# Patient Record
Sex: MALE | Race: WHITE
[De-identification: names, ages, dates, MRNs, and addresses within clinical notes are randomized per-mention and may not be internally consistent; named-entity substitution may affect disease eponyms.]

---
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
## 2020-08-11 ENCOUNTER — OFFICE VISIT (OUTPATIENT)
Dept: BEHAVIORAL HEALTH | Facility: CLINIC | Age: 20
End: 2020-08-11
Payer: COMMERCIAL

## 2020-08-11 VITALS — WEIGHT: 163 LBS

## 2020-08-11 DIAGNOSIS — F20.0 PARANOID SCHIZOPHRENIA (HCC): ICD-10-CM

## 2020-08-11 DIAGNOSIS — F32.1 CURRENT MODERATE EPISODE OF MAJOR DEPRESSIVE DISORDER WITHOUT PRIOR EPISODE (HCC): ICD-10-CM

## 2020-08-11 DIAGNOSIS — F41.1 GENERALIZED ANXIETY DISORDER: ICD-10-CM

## 2020-08-11 PROCEDURE — 99205 OFFICE O/P NEW HI 60 MIN: CPT | Performed by: PSYCHIATRY & NEUROLOGY

## 2020-08-11 PROCEDURE — 96127 BRIEF EMOTIONAL/BEHAV ASSMT: CPT | Performed by: PSYCHIATRY & NEUROLOGY

## 2020-08-11 RX ORDER — ARIPIPRAZOLE 5 MG/1
5 TABLET ORAL DAILY
Qty: 30 TAB | Refills: 0 | Status: SHIPPED | OUTPATIENT
Start: 2020-08-11 | End: 2020-08-20 | Stop reason: SDUPTHER

## 2020-08-11 ASSESSMENT — ANXIETY QUESTIONNAIRES
1. FEELING NERVOUS, ANXIOUS, OR ON EDGE: NEARLY EVERY DAY
2. NOT BEING ABLE TO STOP OR CONTROL WORRYING: NEARLY EVERY DAY
GAD7 TOTAL SCORE: 16
3. WORRYING TOO MUCH ABOUT DIFFERENT THINGS: MORE THAN HALF THE DAYS
7. FEELING AFRAID AS IF SOMETHING AWFUL MIGHT HAPPEN: NEARLY EVERY DAY
4. TROUBLE RELAXING: MORE THAN HALF THE DAYS
5. BEING SO RESTLESS THAT IT IS HARD TO SIT STILL: NOT AT ALL
6. BECOMING EASILY ANNOYED OR IRRITABLE: NEARLY EVERY DAY

## 2020-08-11 ASSESSMENT — PATIENT HEALTH QUESTIONNAIRE - PHQ9
CLINICAL INTERPRETATION OF PHQ2 SCORE: 5
SUM OF ALL RESPONSES TO PHQ QUESTIONS 1-9: 10
5. POOR APPETITE OR OVEREATING: 1 - SEVERAL DAYS

## 2020-08-11 NOTE — PROGRESS NOTES
"INITIAL PSYCHIATRIC EVALUATION      This provider informed the patient their medical records are totally confidential except for the use by other providers involved in their care, or if the patient signs a release, or to report instances of child or elder abuse, or if it is determined they are an immediate risk to harm themselves or others.      CHIEF COMPLAINT  \"I feel some people are against me\"    HISTORY OF PRESENT ILLNESS  Mckay Del Rosario is a 20 y.o. old male comes in today to establish care and for evaluation of anxiety and paranoia.  I did reviewed all outpatient psychiatry follow up notes over last 3 years. Patient is new to this clinic and presented with his mother and family member for recent worsening of paranoid ideations.  Patient believes that everyone is out to get him.  Patient also believed that some people are hacking his phone and PlayStation.  He frequently believes that most people hates him.  According to patient and family that this began last year in October but it was mild but it has increased very quickly over last few months with patient not able to perform well in his job as he believes one specific person believes him and target symptoms specifically.  Patient denies auditory or visual hallucination but family gives many examples consistent with new onset of paranoid delusions.  Family is concerned that he will get angry and may believe family is against him as last Friday he was questioning his family members questions for him.  Patient also report increased anxiety due to this belief and reports worrying about many things and have difficulty controlling his worries with associated muscle tension and him feeling easily irritated or frustrated.  Patient reports feeling depressed for these believes and reports feeling down with decline in interest with feelings of guilt and poor concentration but denies endorsing suicidal or homicidal ideations.  Patient denies meeting criteria " for current or past history of hypomania or fadumo.    I spent time alone with patient and later had session with patient and family together.  We agreed with diagnosis of schizophrenia and comorbid depression and anxiety and agreed with plan of initiating Zoloft and Abilify together.      PSYCHIATRIC REVIEW OF SYSTEMS: denies manic symptoms, denies OCD symptoms, denies restrictive eating or purging, denies trauma related symptoms, see HPI for depressive symptoms, see HPI for anxeity symptoms and see HPI for psychotic symptoms      MEDICAL REVIEW OF SYSTEMS:   Constitutional negative   Eyes negative   Ears/Nose/Mouth/Throat negative   Cardiovascular negative   Respiratory negative   Gastrointestinal negative   Genitourinary negative   Muscular negative   Integumentary negative   Neurological negative   Endocrine negative   Hematologic/Lymphatic negative     CURRENT MEDICATIONS:  Current Outpatient Medications   Medication Sig Dispense Refill   • albuterol (PROVENTIL) 2.5mg/3ml NEBU solution for nebulization 3 mL by Nebulization route every four hours as needed for Shortness of Breath. 75 mL 0   • ADVAIR DISKUS INH Inhale  by mouth as needed.     • ALBUTEROL 90 MCG/ACT AERS Inhale 2 Puffs by mouth every 6 hours as needed for Shortness of Breath. 1 Inhaler 3     No current facility-administered medications for this visit.        ALLERGIES:  Patient has no known allergies.    PAST PSYCHIATRIC HISTORY  Prior psychiatric hospitalization: none  Prior Self harm/suicide attempt: no  Prior Diagnosis: none    PAST PSYCHIATRIC MEDICATIONS  none     FAMILY HISTORY  Psychiatric diagnosis: Maternal uncle with schizophrenia; mother with anxiety; maternal grandmother with depression  History of suicide attempts:  no  Substance abuse history:  no    SUBSTANCE USE HISTORY:  ALCOHOL: no  TOBACCO: no  CANNABIS: occasional: last use 2 months ago; make anxiety worse  OPIOIDS: no  PRESCRIPTION MEDICATIONS: no  OTHERS: no  History of  inpatient/outpatient rehab treatment: no    SOCIAL HISTORY:  Education: Special education studies; IEP; received adjusted diploma in 2018  Employment: employed  Relationship: single  Kids: no  Current living situation: lives with family  Current/past legal issues: no  History of emotional/physical/sexual abuse - no    MEDICAL HISTORY  Past Medical History:   Diagnosis Date   • Asthma      Past Surgical History:   Procedure Laterality Date   • ADENOIDECTOMY     • IN CREATE EARDRUM OPENING,GEN ANESTH      Ear tubes         PHYSICAL EXAMINAION:  Vital signs: Wt 73.9 kg (163 lb)   Musculoskeletal: Normal gait.   Abnormal movements: none    MENTAL STATUS EXAMINATION      General:   - Grooming and hygiene: Casual,   - Apparent distress: none,   - Behavior: Tense  - Eye Contact:  Good,   - no psychomotor agitation or retardation    - Participation: Active verbal participation  Orientation: Alert and Fully Oriented to person, place and time  Mood: Depressed and Anxious  Affect: Constricted,  Thought Process: Goal-directed  Thought Content: Denies suicidal or homicidal ideations, intent or plan Within normal limits  Perception: Denies auditory or visual hallucinations. paraonoid delusions noted  Attention span and concentration: Intact   Speech:Rate within normal limits and Volume within normal limits  Language: Appropriate   Insight: Adequate  Judgment: Adequate  Recent and remote memory: No gross evidence of memory deficits      DEPRESSION SCREENING:  Depression Screen (PHQ-2/PHQ-9) 8/11/2020   PHQ-2 Total Score 5   PHQ-9 Total Score 10       Interpretation of PHQ-9 Total Score   Score Severity   1-4 No Depression   5-9 Mild Depression   10-14 Moderate Depression   15-19 Moderately Severe Depression   20-27 Severe Depression      SAFETY ASSESSMENT - SELF:    Does patient acknowledge current or past symptoms of dangerousness to self? no  History of suicide by family member: no  History of suicide by friend/significant  other: no  Recent change in amount/specificity/intensity of suicidal thoughts or self-harm behavior? no  Current access to firearms, medications, or other identified means of suicide/self-harm? no  Protective factors present: family support       SAFETY ASSESSMENT - OTHERS:    Does patient acknowledge current or past symptoms of aggressive behavior or risk to others? no  Recent change in amount/specificity/intensity of thoughts or threats to harm others? no  Current access to firearms/other identified means of harm? no      CURRENT RISK:       Suicidal: Low       Homicidal: Low       Self-Harm: Low       Relapse: Low       Crisis Safety Plan Reviewed Not Indicated    MEDICAL RECORDS/LABS/DIAGNOSTIC TESTS REVIEWED:  None found    [facility] records -   [id number]   No data.     ASSESSMENT  Patient is a 20-year-old male presented with family for recent worsening of depression, anxiety and paranoid ideation.  Patient has agreed with plan of initiating medication at this time.      DIFFERENTIAL DIAGNOSES  1. Psychotic disorder rule out paranoid schizophrenia  2. Generalized anxiety disorder  3. Major depressive disorder, moderate  4. Cannabis abuse      PLAN:  (1) Psychotic disorder rule out paranoid schizophrenia  • Add Abilify 2.5 mg daily for for 6-day and then increase to 5 mg daily for psychosis management and depression augmentation.  Given 1 month supply with no refill.  • In next session we will assess if referral for psychotherapy is indicated.  • Will need baseline lab work in next session as patient is initiated on Abilify.  • Medication options, alternatives (including no medications) and medication risks/benefits/side effects were discussed in detail.  • The patient was advised to call, message provider on Dick or Brohart, or come in to the clinic if symptoms worsen or if any future questions/issues regarding their medications arise; the patient verbalized understanding and agreement.    • The patient was educated to  call 911, call the suicide hotline, or go to local ER if having thoughts of suicide or homicide; verbalized understanding.    (2) Generalized anxiety disorder  • GAD7: 16  • Add Zoloft 25 mg daily for 6 days and then increase to 50 mg daily for depression and anxiety management.  Given 1 month supply with no refill.  • Add Abilify 2.5 mg daily for for 6-day and then increase to 5 mg daily for psychosis management and depression augmentation.  Given 1 month supply with no refill.  • In next session we will assess if referral for psychotherapy is indicated.  • Will need baseline lab work in next session as patient is initiated on Abilify.    (3) Major depressive disorder  • PHQ9: 10  • Add Zoloft 25 mg daily for 6 days and then increase to 50 mg daily for depression and anxiety management.  Given 1 month supply with no refill.  • Add Abilify 2.5 mg daily for for 6-day and then increase to 5 mg daily for psychosis management and depression augmentation.  Given 1 month supply with no refill.  • In next session we will assess if referral for psychotherapy is indicated.  • Will need baseline lab work in next session as patient is initiated on Abilify.    Return to clinic in 4 weeks or sooner if symptoms worsen.  Next Appointment:  instruction provided on how to make the next appointment.     The proposed treatment plan was discussed with the patient who was provided the opportunity to ask questions and make suggestions regarding alternative treatment. Patient verbalized understanding and expressed agreement with the plan.     Thank you for allowing me to participate in the care of this patient.    Garcia Armendariz M.D.  08/11/20    CC:   Pcp Pt States None    This note was created using voice recognition software (Dragon). The accuracy of the dictation is limited by the abilities of the software. I have reviewed the note prior to signing, however some errors in grammar and context are still possible. If you have any  questions related to this note please do not hesitate to contact our office.

## 2020-08-13 ENCOUNTER — HOSPITAL ENCOUNTER (EMERGENCY)
Facility: MEDICAL CENTER | Age: 20
End: 2020-08-14
Attending: EMERGENCY MEDICINE
Payer: COMMERCIAL

## 2020-08-13 DIAGNOSIS — F22 PARANOID BEHAVIOR (HCC): ICD-10-CM

## 2020-08-13 DIAGNOSIS — F41.1 ANXIETY REACTION: ICD-10-CM

## 2020-08-13 LAB
AMPHET UR QL SCN: NEGATIVE
BARBITURATES UR QL SCN: NEGATIVE
BENZODIAZ UR QL SCN: NEGATIVE
BZE UR QL SCN: NEGATIVE
CANNABINOIDS UR QL SCN: NEGATIVE
METHADONE UR QL SCN: NEGATIVE
OPIATES UR QL SCN: NEGATIVE
OXYCODONE UR QL SCN: NEGATIVE
PCP UR QL SCN: NEGATIVE
POC BREATHALIZER: 0 PERCENT (ref 0–0.01)
PROPOXYPH UR QL SCN: NEGATIVE

## 2020-08-13 PROCEDURE — 99284 EMERGENCY DEPT VISIT MOD MDM: CPT

## 2020-08-13 PROCEDURE — 302970 POC BREATHALIZER: Performed by: EMERGENCY MEDICINE

## 2020-08-13 PROCEDURE — 80307 DRUG TEST PRSMV CHEM ANLYZR: CPT

## 2020-08-13 SDOH — HEALTH STABILITY: MENTAL HEALTH: HOW OFTEN DO YOU HAVE A DRINK CONTAINING ALCOHOL?: NEVER

## 2020-08-13 ASSESSMENT — ENCOUNTER SYMPTOMS
SEIZURES: 0
HEADACHES: 0
DEPRESSION: 1
NECK PAIN: 0
BACK PAIN: 0
FEVER: 0
COUGH: 0
FOCAL WEAKNESS: 0
SORE THROAT: 0
ABDOMINAL PAIN: 0
SHORTNESS OF BREATH: 0
VOMITING: 0
CHILLS: 0
EYE REDNESS: 0
BLURRED VISION: 0
NERVOUS/ANXIOUS: 1

## 2020-08-14 VITALS
OXYGEN SATURATION: 97 % | RESPIRATION RATE: 16 BRPM | BODY MASS INDEX: 22.56 KG/M2 | DIASTOLIC BLOOD PRESSURE: 72 MMHG | WEIGHT: 161.16 LBS | HEIGHT: 71 IN | SYSTOLIC BLOOD PRESSURE: 125 MMHG | HEART RATE: 64 BPM | TEMPERATURE: 98.3 F

## 2020-08-14 NOTE — ED TRIAGE NOTES
Pt presents with mother from home for a panic attack and suicidal ideation. Recently dx with panic disorder and schizophrenia. Pt attempted to cut his left wrist about 20 min ago. Superficial abrasion noted to wrist. No bleeding.   Recently rx zoloft and another med that mother doesn't remember. Pt A&Ox4 and ambulatory.    Patient masked. No respiratory symptoms, no recent travel, denies known COVID exposure.

## 2020-08-14 NOTE — ED NOTES
Discharge instructions provided.  Pt verbalized the understanding of discharge instructions to follow up with PCP/[facility] and to return to ER if condition worsens.  Pt ambulated out of ER without difficulty.

## 2020-08-14 NOTE — CONSULTS
[facility]   TRIAGE ASSESSMENT    Name: Mckay Del Rosario  MRN: [id number]  : 2000  Age: 20 y.o.  Date of assessment: 2020  PCP: Karen Celis P.A.-C.  Persons in attendance: Patient and Biological Mother    CHIEF COMPLAINT/PRESENTING ISSUE (as stated by Mckay Del Rosario, via a secured, encrypted connection utilizing tele-health): The patient presents as a 20 year-old male, arriving to the ER after experiencing a panic attack and stating SI; the patient was noted as having a superficial abrasion to his wrist with no bleeding. Per patient, he reports having felt triggered after speaking to a family member earlier this evening, which he notes prompted these thoughts. The patient is currently taking Zoloft and one other medication (the patient was unable to recall specifically, but noted it may have been Abilify); he denies any previous suicide attempts, inpatient psychiatric admissions, or self-harm. The patient completed a safety plan with this writer during the course of the evaluation, discussing coping skills, distractions, and utilizing supports when needed, which he engaged in with both insight and good judgment. Patient calm and cooperative during the assessment; he is alert, oriented, and denies any current SI/HI, as well as AH/VH. Thought content normal and congruent to the context of the situation; speech noted as both normal in rate and volume. He is future/forward thinking and reports no additional safety concerns. Patient to continue to follow-up with his current medication provider and will contact [facility] (With whom he is currently connected) in the AM to request an outpatient therapy provider for additional support. Patient will be safe to discharge to home shortly, pending any remaining medical interventions.   Chief Complaint   Patient presents with   • Suicidal Ideation   • Anxiety        CURRENT LIVING SITUATION/SOCIAL SUPPORT: The patient lives  "with his mother in a shared residence; he has no children and is unmarried. Per patient his father has been \"out of the picture\" for some time now. He works a full-time job and notes he has several family members and friends he is close with.     BEHAVIORAL HEALTH TREATMENT HISTORY  Does patient/parent report a history of prior behavioral health treatment for patient?   Yes:    Dates Level of Care Facilty/Provider Diagnosis/Problem Medications   Current OP [facility] (CHRISTOPHE Armendariz) Panic DO Zoloft, and Abilify( Per pt he is unsure if he is taking Abilify, but reports this currently as his best educated guess)       SAFETY ASSESSMENT - SELF  Does patient acknowledge current or past symptoms of dangerousness to self? Yes, patient admits to having brief SI this evening and making one superficial scratch to his wrist. No previous attempts or SI noted/reported.  Does parent/significant other report patient has current or past symptoms of dangerousness to self? Yes, parent admits patient had brief SI this evening and scratched his wrist.  Does presenting problem suggest symptoms of dangerousness to self? No.    SAFETY ASSESSMENT - OTHERS  Does patient acknowledge current or past symptoms of aggressive behavior or risk to others? no  Does parent/significant other report patient has current or past symptoms of aggressive behavior or risk to others?  no  Does presenting problem suggest symptoms of dangerousness to others? No    Crisis Safety Plan completed and copy given to patient? Yes, copy provided to patient.    ABUSE/NEGLECT SCREENING  Does patient report feeling “unsafe” in his/her home, or afraid of anyone?  no  Does patient report any history of physical, sexual, or emotional abuse?  no  Does parent or significant other report any of the above? no  Is there evidence of neglect by self?  no  Is there evidence of neglect by a caregiver? no  Does the patient/parent report any history of CPS/APS/police " "involvement related to suspected abuse/neglect or domestic violence? no  Based on the information provided during the current assessment, is a mandated report of suspected abuse/neglect being made?  No    SUBSTANCE USE SCREENING  Yes:  Carlos all substances used in the past 30 days:      Last Use Amount   []   Alcohol     []   Marijuana     []   Heroin     []   Prescription Opioids  (used without prescription, for    recreation, or in excess of prescribed amount)     []   Other Prescription  (used without prescription, for    recreation, or in excess of prescribed amount)     []   Cocaine      []   Methamphetamine     []   \"\" drugs (ectasy, MDMA)     []   Other substances        UDS results: Negative  Breathalyzer results: 0.00    What consequences does the patient associate with any of the above substance use and or addictive behaviors? None    Risk factors for detox (check all that apply):  []  Seizures   []  Diaphoretic (sweating)   []  Tremors   []  Hallucinations   []  Increased blood pressure   []  Decreased blood pressure   []  Other   []  None      [] Patient education on risk factors for detoxification and instructed to return to ER as needed.      MENTAL STATUS   Participation: Active verbal participation, Attentive, Engaged and Open to feedback  Grooming: Good  Orientation: Alert and Fully Oriented  Behavior: Calm/Cooperative  Eye contact: Good  Mood: Euthymic  Affect: Full range and Congruent with content  Thought process: Logical and Goal-directed  Thought content: Within normal limits  Speech: Rate within normal limits and Volume within normal limits  Perception: Within normal limits  Memory:  No gross evidence of memory deficits  Insight: Adequate  Judgment:  Adequate  Other:    Collateral information:   Source:  [] Significant other present in person:   [] Significant other by telephone  [] [facility]   [x] [facility]  [x] [facility]  [x] Other: Mother, Marilou, at " bedside at request of patient for additional information and emotional support.         CLINICAL IMPRESSIONS:  Primary: Panic DO  Secondary:         IDENTIFIED NEEDS/PLAN:  [Trigger DISPOSITION list for any items marked]    []  Imminent safety risk - self [] Imminent safety risk - others   []  Acute substance withdrawal []  Psychosis/Impaired reality testing   [x]  Mood/anxiety []  Substance use/Addictive behavior   []  Maladaptive behaviro []  Parent/child conflict   [x]  Family/Couples conflict []  Biomedical   []  Housing []  Financial   []   Legal  Occupational/Educational   []  Domestic violence []  Other:     Disposition: The patient will be safe to discharge to home with his mother from the ER, where he will follow-up with his current medication provider and also connect to an OP therapist for on-going support. Patient agreeable to plan; he also completed a Crisis Safety Plan with this writer at the time of assessment and will be provided a copy prior to discharge, which he notes he will utilize. No additional safety concerns noted or observed/reported; patient to discharge safely from the ER shortly.     Does patient express agreement with the above plan? yes    Referral appointment(s) scheduled? N\A    Alert team only: Evaluation completed via tele-health by this writer from [facility] via a secured, encrypted connection. Patient located at [facility].     I have discussed findings and recommendations with Dr. Gilman who is in agreement with these recommendations.         FRIEDA Vallejo  8/14/2020

## 2020-08-14 NOTE — ED NOTES
Assumed patient care. Pt assesement done.  Plan of care reviewed with patient. Mother at bedside and 1:1 sitter in place.

## 2020-08-14 NOTE — ED PROVIDER NOTES
ED Provider Note    CHIEF COMPLAINT  Chief Complaint   Patient presents with   • Suicidal Ideation   • Anxiety       HPI  Mckay Del Rosario is a 20 y.o. male with history of asthma and underlying psychiatric disorder who presents with suicidal ideation and anxiety.  The patient presents with his mother who states that the symptoms have really started for the first time over the last few weeks.  She states that he has had social anxiety in the past however he has had a couple episodes over the last 2 weeks at work where he is felt very paranoid that people that are his good friends are out to get him and hate him.  This got to the point where he has been unable to return to work.  He was seen by psychiatry 2 days ago and they were concerned about a new diagnosis of paranoid schizophrenia.  They started him on Zoloft and Abilify which she has been taking consistently.  Tonight he went out to dinner with his grandfather and grandmother and had a significant panic attack and anxiety, paranoia regarding some statements from his grandfather.  He went home from dinner and the police and paramedics were called.  They were able to calm him down there and left in there at home with his mother.  Following this his behavior escalated again and she had another significant episode of anxiety.  He did grab a knife and attempted to cut his left forearm.  He now reports improvement of his symptoms.  He denies any current suicidal ideation or plan however he does continue to make paranoid statements here.  He denies any alcohol or drug use this evening.  No recent medical complaints.    REVIEW OF SYSTEMS  See HPI for further details.   Review of Systems   Constitutional: Negative for chills and fever.   HENT: Negative for sore throat.    Eyes: Negative for blurred vision and redness.   Respiratory: Negative for cough and shortness of breath.    Cardiovascular: Negative for chest pain and leg swelling.   Gastrointestinal:  "Negative for abdominal pain and vomiting.   Genitourinary: Negative for dysuria and urgency.   Musculoskeletal: Negative for back pain and neck pain.   Skin: Negative for rash.   Neurological: Negative for focal weakness, seizures and headaches.   Psychiatric/Behavioral: Positive for depression and suicidal ideas. The patient is nervous/anxious.          PAST MEDICAL HISTORY   has a past medical history of Asthma and Psychiatric disorder.    SOCIAL HISTORY  Social History     Tobacco Use   • Smoking status: Never Smoker   • Smokeless tobacco: Never Used   Substance and Sexual Activity   • Alcohol use: Never     Frequency: Never   • Drug use: Not Currently     Types: Inhaled     Comment: marijuana   • Sexual activity: Not on file       SURGICAL HISTORY   has a past surgical history that includes adenoidectomy and create eardrum opening,gen anesth.    CURRENT MEDICATIONS  Home Medications     Reviewed by Soraida Roman R.N. (Registered Nurse) on 08/13/20 at 0673  Med List Status: Not Addressed   Medication Last Dose Status   ADVAIR DISKUS INH  Active   albuterol (PROVENTIL) 2.5mg/3ml NEBU solution for nebulization  Active   ALBUTEROL 90 MCG/ACT AERS  Active   aripiprazole (ABILIFY) 5 MG tablet  Active   sertraline (ZOLOFT) 50 MG Tab  Active                ALLERGIES  No Known Allergies    PHYSICAL EXAM   VITAL SIGNS: /72   Pulse 64   Temp 36.8 °C (98.3 °F) (Temporal)   Resp 16   Ht 1.803 m (5' 11\")   Wt 73.1 kg (161 lb 2.5 oz)   SpO2 97%   BMI 22.48 kg/m²      Physical Exam   Constitutional: He is oriented to person, place, and time and well-developed, well-nourished, and in no distress. No distress.   Nontoxic appearing young male   HENT:   Head: Normocephalic and atraumatic.   Eyes: Pupils are equal, round, and reactive to light. Conjunctivae are normal.   Neck: Normal range of motion. Neck supple.   Cardiovascular: Normal rate, regular rhythm and normal heart sounds.   Pulmonary/Chest: Effort normal " "and breath sounds normal. No respiratory distress.   Abdominal: Soft. He exhibits no distension. There is no abdominal tenderness.   Musculoskeletal: Normal range of motion.         General: No tenderness or edema.   Neurological: He is alert and oriented to person, place, and time.   Moving all extremities spontaneously   Skin: Skin is warm and dry.   Very superficial abrasion to the left forearm   Psychiatric: Mood and affect normal.   Makes intermittent paranoid/delusional statements however is overall calm and cooperative.  He denies any current suicidal or homicidal ideation.  Not responding to external stimuli.         DIAGNOSTIC STUDIES    LABS  Personally reviewed by me  Labs Reviewed   POC BREATHALIZER - Normal   URINE DRUG SCREEN         ED COURSE  Vitals:    08/13/20 2233 08/13/20 2234 08/14/20 0116   BP:  127/79 125/72   Pulse:  67 64   Resp:  20 16   Temp:  36.8 °C (98.3 °F)    TempSrc:  Temporal    SpO2:  94% 97%   Weight: 73.1 kg (161 lb 2.5 oz)     Height: 1.803 m (5' 11\")           Medications administered:  Medications - No data to display      Old records personally reviewed:  I reviewed records from psychiatry 2 days ago.  They started him on Zoloft and Abilify for psychotic disorder and generalized anxiety.  Possible plan to start psychotherapy as well.      MEDICAL DECISION MAKING  Otherwise healthy young male who presents with recent increase in anxiety and paranoid behavior over the last few weeks.  He is recently been placed on medication by psychiatry however presented this evening following an anxiety attack and attempt at cutting his left arm.  He is alert and pleasant on arrival with normal vital signs.  He has a very superficial abrasion to the left forearm which does not need repair.  He is not intoxicated, alcohol and drug screening are negative.  He is denying current suicidal ideation to me.  Will have behavioral health team evaluate the patient to help come up with " disposition.    The patient was evaluated by [facility] specialist.  He was able to formulate a good safety plan with the patient and feels that he is safe for discharge home.  The patient does live with his mother and they were able to identify triggers which he can avoid.  They have a plan to go into [facility] tomorrow for evaluation and initiation of counseling.  They will also call his established psychiatrist.    Upon reassessment, patient is resting comfortably with normal vital signs.  No new complaints at this time.  He again denies any suicidal ideation for me and both him and his mother feel comfortable with the plan for discharge home.  Discussed results with patient and/or family as well as importance of primary care/psychiatry follow up.  Patient understands plan of care and strict return precautions for new or changing symptoms.       IMPRESSION  (F41.1) Anxiety reaction  (F22) Paranoid behavior (HCC)    Disposition: Discharge home, stable condition  Results, diagnoses, and treatment options were discussed with the patient and/or family. Patient verbalized understanding of plan of care.    Patient referred to primary care provider for monitoring and treatment of blood pressure.      Discharge Medication List as of 8/14/2020  1:13 AM              Electronically signed by: Claritza Gilman M.D., 8/13/2020 11:35 PM

## 2020-08-14 NOTE — DISCHARGE PLANNING
"    [facility]  Crisis/Safety Plan    Name:  Mckay Del Rosario  MRN:  [id number]  Date:  2020    Warning signs that a crisis may be developing for me or I may be at risk:  1) When I become \"triggered\" and begin to feel anxious or frustrated  2) When I withdraw from others and I find myself having thoughts of self-harm or depression.  3)    Coping strategies I can use on my own (relaxation, physical activity, etc):  1) I can take a relaxing mindfulness walk at a local park, getting some fresh and exercise.  2) I can watch an enjoyable TV program in order to distract myself.  3) I can take a soothing shower or bath to take my mind off of my thoughts and just relax.    Ways I can make my environment safe:  1) Dispose of old, unused medications that may be in the house  2) Secure any firearms or unsecured, sharp objects that my present as safety concerns.  3)    Things I want to tell myself when I feel a crisis developin) I have a great life and love myself and my family.  2) I can control my emotions and thoughts when I feel stressed or anxious.  3) I can and will reach out to others if I feel a crisis developing; I am loved and have great, supportive people around me who will help me if I need them.     People I can contact for support or distraction (and their phone numbers):  1) My mother, Marilou  2) My grandmother, Thao  3)    If I’m not able to reach my support people, or the above strategies don’t help, I can contact the following professionals, agencies, or hotlines:  1) Crisis Call Center ():  [phone] -OR- [phone]  2) Crisis Text Line ():  Text CARE TO 422864  3) 9--  4)     MOISE Vallejo.    "

## 2020-08-14 NOTE — DISCHARGE INSTRUCTIONS
You were seen in the Emergency Department for anxiety and paranoid behavior    Please use 1,000mg of tylenol or 600mg of ibuprofen every 6 hours as needed for pain.  Continue psychiatric medications as directed.    Please follow up with your primary care physician as well as going in to [facility] tomorrow for immediate evaluation and initiation of weekly counseling.    Return to the Emergency Department with suicidal ideation, homicidal ideation, worsening behavioral issues, or other concerns.

## 2020-08-17 ENCOUNTER — OFFICE VISIT (OUTPATIENT)
Dept: BEHAVIORAL HEALTH | Facility: CLINIC | Age: 20
End: 2020-08-17
Payer: COMMERCIAL

## 2020-08-17 DIAGNOSIS — F41.1 GENERALIZED ANXIETY DISORDER: ICD-10-CM

## 2020-08-17 DIAGNOSIS — R45.851 SUICIDAL IDEATIONS: ICD-10-CM

## 2020-08-17 DIAGNOSIS — F20.0 PARANOID SCHIZOPHRENIA (HCC): ICD-10-CM

## 2020-08-17 DIAGNOSIS — F32.1 CURRENT MODERATE EPISODE OF MAJOR DEPRESSIVE DISORDER WITHOUT PRIOR EPISODE (HCC): ICD-10-CM

## 2020-08-17 PROCEDURE — 90791 PSYCH DIAGNOSTIC EVALUATION: CPT | Performed by: MARRIAGE & FAMILY THERAPIST

## 2020-08-17 RX ORDER — RISPERIDONE 2 MG/1
2 TABLET ORAL
Qty: 30 TAB | Refills: 0 | Status: SHIPPED | OUTPATIENT
Start: 2020-08-17 | End: 2020-09-11

## 2020-08-17 NOTE — BH THERAPY
[facility]  INITIAL ASSESSMENT    Name: Mckay Del Rosario  MRN: [id number]  : 2000  Age: 20 y.o.  Date of assessment: 2020  PCP: Karen Celis P.A.-C.  Persons in attendance: Patient and Patient's Grandmother attend intake the last 5 minutes of session  Total session time: 45 minutes      CHIEF COMPLAINT AND HISTORY OF PRESENTING PROBLEM:  (as stated by Patient):  Mckay Del Rosario is a 20 y.o., White male referred for assessment by No ref. provider found.  Primary presenting issue includes   Chief Complaint   Patient presents with   • Depression   • Anxiety     Patient reports recent anxiety attack when felt that he was being hacked by his granfather/and his girlfriend   • Stress     Patient reports he was bullied at work and is on medical leave       FAMILY/SOCIAL HISTORY  Current living situation/household members: Mom and patient and pet Patches  Relevant family history/structure/dynamics: Mom and Dad  when Child.  Current family/social stressors: work related stress  Quality/quantity of current family and/or social support: good family support  Does patient/parent report a family history of behavioral health issues, diagnoses, or treatment? No  No family history on file.     BEHAVIORAL HEALTH TREATMENT HISTORY  Does patient/parent report a history of prior behavioral health treatment for patient? NA    History of untreated behavioral health issues identified? No    MEDICAL HISTORY  Primary care behavioral health screenings:    Depression Screen (PHQ-2/PHQ-9) 2020   PHQ-2 Total Score 5   PHQ-9 Total Score 10       Interpretation of PHQ-9 Total Score   Score Severity   1-4 No Depression   5-9 Mild Depression   10-14 Moderate Depression   15-19 Moderately Severe Depression   20-27 Severe Depression     HUGO 7 2020   Total Score 16       Interpretation of HUGO 7 Total Score   Score Severity:  0-4 No Anxiety   5-9 Mild Anxiety  10-14 Moderate Anxiety  15-21  Severe Anxiety     RESULTS OF SCREENING MEASURES:    [] Not applicable  Measure: PHQ9 Score:     Measure: HUGO-7 Score:   Measure: PTSD Score:  Measure: DAST Score:  Measure: AUDIT Score:     Past medical/surgical history:   Past Medical History:   Diagnosis Date   • Asthma    • Psychiatric disorder     anxiety and schizophrenia      Past Surgical History:   Procedure Laterality Date   • ADENOIDECTOMY     • PB CREATE EARDRUM OPENING,GEN ANESTH      Ear tubes        Medication Allergies:  Patient has no known allergies.   Medical history provided by patient during current evaluation: Yes    Patient reports last physical exam: 1 year ago  Does patient/parent report any history of or current developmental concerns? Learning disorder with reading/writing  Does patient/parent report nutritional concerns? No  Does patient/parent report change in appetite or weight loss/gain? No  Does patient/parent report history of eating disorder symptoms? No  Does patient/parent report dental problem? No  Does patient/parent report physical pain? No   Indicate if pain is acute or chronic, and location: NA   Pain scale rating:       Does patient/parent report functional impact of medical, developmental, or pain issues?   N\A    EDUCATIONAL/LEARNING HISTORY  Is patient currently enrolled in a school/educational program?   High School     EMPLOYMENT/RESOURCES  Is the patient currently employed? Yes, at [employer] (off 4 weeks for medical leave; Ms. Welch approved at work).  Does the patient/parent report adequate financial resources? Yes  Does patient identify impact of presenting issue on work functioning? Yes  Work or income-related stressors:  Work related stresses with bullying from co-worker and on medical/mental health leave.     HISTORY:  None    SPIRITUAL/CULTURAL/IDENTITY:  What are the patient’s/family’s spiritual beliefs or practices? None  What is the patient’s cultural or ethnic background/identity? Gia/Carl  How  does the patient identify their sexual orientation? straight  How does the patient identify their gender? male  Does the patient identify any spiritual/cultural/identity factors as relevant to the presenting issue? No    LEGAL HISTORY  None    ABUSE/NEGLECT/TRAUMA SCREENING  None      SAFETY ASSESSMENT - SELF  Does patient acknowledge current or past symptoms of dangerousness to self? No  Does parent/significant other report patient has current or past symptoms of dangerousness to self? Patient reports 4 days ago; cutting self with a knife was upset with grandpa; and on Rx      Recent change in frequency/specificity/intensity of suicidal thoughts or self-harm behavior? Patient reports no current thoughts of SI or cutting urges; however, self-cut 4 days ago and currenlty being monitored by family  Current access to firearms, medications, or other identified means of suicide/self-harm? No  If yes, willing to restrict access to means of suicide/self-harm? Yes  Protective factors present: Hopefulness and Reasons for living identified by patient: Family (Mom and Grandmother)    Current Suicide Risk: High  Crisis Safety Plan completed and copy given to patient: Will follow up at next session and curently on Suicide watch from family    SAFETY ASSESSMENT - OTHERS  None  SUBSTANCE USE/ADDICTION HISTORY  [] Not applicable - patient 10 years of age or younger    Is there a family history of substance use/addiction? NA  Does patient acknowledge or parent/significant other report use of/dependence on substances? No  Last time patient used alcohol: NA  Within the past week? No  Last time patient used marijuana: two months ago  Within the past month? No  Any other street drugs ever tried even once? No  Any use of prescription medications/pills without a prescription, or for reasons others than originally prescribed?  No  Any other addictive behavior reported (gambling, shopping, sex)? No     Drug History:    Amphetamine:  "none      Cannibis: 2 months ago; currently not using      Cocaine: none      Ecstasy: none      Hallucinogen: none      Inhalant: none      Opiate: none      Other:      Sedative: none          What consequences does the patient associate with any of the above substance use and or addictive behaviors? None    Patient’s motivation/readiness for change: High; patient reports one time \"peer pressured by friend and had a bad experience.\"     [] Patient denies use of any substance/addictive behaviors    STRENGTHS/ASSETS  Strengths Identified by interviewer: Insight into problems, Self-awareness, Optimism, Sense of humor and Cognitive flexibility  Strengths Identified by patient: \"Kind, easy going, friendly, \"good kid.\"    MENTAL STATUS/OBSERVATIONS   Participation: Active verbal participation, Attentive, Engaged and Open to feedback  Grooming: Casual  Orientation:Fully Oriented   Behavior: Calm  Eye contact: Good   Mood:Depressed and Anxious  Affect:Full range  Thought process: Logical  Thought content:  Within normal limits  Speech: Rate within normal limits  Perception: Within normal limits  Memory: No gross evidence of memory deficits  Insight: Good  Judgment:  Good  Other:    Family/couple interaction observations: NA      CLINICAL FORMULATION:     Patient reports medical leave for 4 weeks from [facility] to cope with anxiety and depression work related issues with bullying from a co-worker.    Grandfather and girlfriend said trigger words such as \"hacking on phone\" and caused panic attack and patient drove home quickly. \"I think they know someone who can hack my phone.\"    Patient and grandmother also discussed about recent self-cutting episode on his wrist; and he is being monitored by family. Additionally, referral by psychiatrist and currently on medication to help with sx of depression and self-cutting. He indicates that he is currently not suicidal.           DIAGNOSTIC IMPRESSION(S):  1. Generalized anxiety " disorder    2. Suicidal ideations    3. Current moderate episode of major depressive disorder without prior episode (HCC)    4. Paranoid schizophrenia (HCC)          IDENTIFIED NEEDS/PLAN:  [If any of these marked, trigger DISPOSITION list]  Imminent safety risk - self and Mood/anxiety  Patient is being monitored by his grandmother and mom with recent self-cutting 4 days ago.     Does patient express agreement with the above plan? Yes     Referral appointment(s) scheduled? Patient was informed to make next appointment with the front office;with weekly visits to helps monitorin SI/self-harm.       MOISE Wakefield.

## 2020-08-17 NOTE — BH THERAPY
[facility]  Therapy Progress Note      Patient Name: Mckay Del Rosario  Patient MRN: [id number]  Today's Date: 2020     Type of session:{[facility] SERVICES:26194750}  Length of session: *** minutes  Persons in attendance:{[facility] HEALTH ATTENDEES:25101118}    Subjective/New Info: ***    Objective/Observations:   Participation: {[facility] PARTICIPATION MEASURES:[id number]}   Grooming: {[facility] GROOMIN}   Cognition: {[facility] ORIENTATION:44090926}   Eye contact: {[facility] EYE CONTACT:66363531}   Mood: {[facility] MOOD:35988804}   Affect: {[facility] AFFECT:83778243}   Thought process: {[facility] THOUGHT PROCESS:97086342}   Speech: {[facility] SPEECH:66856954}   Other:     Diagnoses: No diagnosis found.     Current risk:   SUICIDE: {[facility] RATINGS:80908631}   Homicide: {[facility] RATINGS:94547538}   Self-harm: {[facility] RATINGS:00785653}   Relapse: {[facility] RATINGS:05481989}   Other:    Safety Plan reviewed? {YES/NO/NOT INDICATED:33694}   If evidence of imminent risk is present, intervention/plan:     Therapeutic Intervention(s): {[facility] THERAPEUTIC INTERVENTION:55371396}    Treatment Goal(s)/Objective(s) addressed: ***     Progress toward Treatment Goals: {[facility] GOAL PROGRESS:64316631}    Plan:  {[facility] TREATMENT PLANS:72772709}    FRIEDA Wakefield  2020

## 2020-08-17 NOTE — PROGRESS NOTES
Telephone Appointment Visit   As a means of avoiding spread of COVID-19, this visit is being conducted by telephone. This telephone visit was initiated by the patient and they verbally consented.    Time at start of call: 9:55 am    Reason for Call:  Symptom Follow-up    HPI:    Family member messaged me: Mckay has had another breakdown, I called 911, we are currently at  [facility]. It was horrible the worse so far, He grabbed a knife and said he could not take it anymore. We need your help . Marilou his mom is inside the hospital now, I have to wait in the car     I called and spoke with grandmother and patient separately.  Agreed that patient is showing signs of worsening paranoia related agitation and increased anxiety.  Patient was initially hesitant to take medication but is currently taking medication under family guidance.  Agreed with plan of increasing sertraline to 50 mg and increasing Abilify to 5 mg daily.  Agreed with plan of utilizing risperidone 2 mg as PRN for severe agitation or worsening psychotic symptoms.  Patient also agreed with the planning and agreed with plan of initiating psychotherapy session today and following with me in 3 days to plan further treatment planning.      Assessment and Plan:     1. Generalized anxiety disorder    2. Paranoid schizophrenia (HCC)  - risperiDONE (RISPERDAL) 2 MG Tab; Take 1 Tab by mouth 1 time daily as needed (for agitation or worsening psychosis).  Dispense: 30 Tab; Refill: 0  - Increase Abilify to 5 mg daily for psychosis management.  - Increase sertraline to 50 mg daily for mood and anxiety symptoms management    Follow-up: in 3 days    Time at end of call: 10:05 am  Total Time Spent: 5-10 minutes    Garcia Armendariz M.D.

## 2020-08-20 ENCOUNTER — OFFICE VISIT (OUTPATIENT)
Dept: BEHAVIORAL HEALTH | Facility: CLINIC | Age: 20
End: 2020-08-20
Payer: COMMERCIAL

## 2020-08-20 DIAGNOSIS — F32.1 CURRENT MODERATE EPISODE OF MAJOR DEPRESSIVE DISORDER WITHOUT PRIOR EPISODE (HCC): ICD-10-CM

## 2020-08-20 DIAGNOSIS — F41.1 GENERALIZED ANXIETY DISORDER: ICD-10-CM

## 2020-08-20 DIAGNOSIS — F20.0 PARANOID SCHIZOPHRENIA (HCC): ICD-10-CM

## 2020-08-20 PROCEDURE — 90833 PSYTX W PT W E/M 30 MIN: CPT | Performed by: PSYCHIATRY & NEUROLOGY

## 2020-08-20 PROCEDURE — 99213 OFFICE O/P EST LOW 20 MIN: CPT | Performed by: PSYCHIATRY & NEUROLOGY

## 2020-08-20 RX ORDER — ARIPIPRAZOLE 5 MG/1
5 TABLET ORAL DAILY
Qty: 30 TAB | Refills: 0 | Status: SHIPPED | OUTPATIENT
Start: 2020-08-20 | End: 2020-09-17 | Stop reason: SDUPTHER

## 2020-08-20 NOTE — PROGRESS NOTES
PSYCHIATRY FOLLOW-UP NOTE      Name: Mckay Del Rosario  MRN: [id number]  : 2000  Age: 20 y.o.  Date of assessment: 2020  PCP: Karen Celis P.A.-C.  Persons in attendance: Patient  Total face-to-face time: 25 minutes    REASON FOR VISIT/CHIEF COMPLAINT (as stated by Patient):  Mckay Del Rosario is a 20 y.o., White male, attending follow-up appointment for mood and anxiety management.      HISTORY OF PRESENT ILLNESS:  Mckay Del Rosario is a 20 y.o. old male with schizophrenia (new onset), MDD & HUGO comes in today for follow up. Patient was last seen 1 week ago, and following treatment planning recommendations were done:  · Add Zoloft 25 mg daily for 6 days and then increase to 50 mg daily for depression and anxiety management.  Given 1 month supply with no refill.  · Add Abilify 2.5 mg daily for for 6-day and then increase to 5 mg daily for psychosis management and depression augmentation.  Given 1 month supply with no refill.  · In next session we will assess if referral for psychotherapy is indicated.  · Will need baseline lab work in next session as patient is initiated on Abilify.    On 20: Family member messaged me: Mckay has had another breakdown, I called 911, we are currently at  [facility]. It was horrible the worse so far, He grabbed a knife and said he could not take it anymore. We need your help . Marilou his mom is inside the hospital now, I have to wait in the car      I called and spoke with grandmother and patient separately.  Agreed that patient is showing signs of worsening paranoia related agitation and increased anxiety.  Patient was initially hesitant to take medication but is currently taking medication under family guidance.  Agreed with plan of increasing sertraline to 50 mg and increasing Abilify to 5 mg daily.  Agreed with plan of utilizing risperidone 2 mg as PRN for severe agitation or worsening psychotic symptoms.  Patient also agreed with the  planning and agreed with plan of initiating psychotherapy session today and following with me in 3 days to plan further treatment planning.    Patient is compliant with medications with no acute side effects.  Patient talked in length about the recent incident and remained with poor insight regarding underlying paranoid delusion.  Patient appears calm and cooperative during entire evaluation and was receptive to the discussion regarding diagnosis and treatment planning.  Patient agreed with plan of taking Abilify after dinner as he is feeling little sedation.  Patient is currently denying hallucination or delusion and denies endorsing high anxiety symptoms but remain anxious about going back to work.  I also spent time with mother alone and discussed the treatment planning and coping strategy in detail.    RESPONSE TO TREATMENT:  Slow improvement      MEDICATION SIDE EFFECTS:  none      PSYCHOTHERAPY ASPECT OF SESSION (16 MIN):  • Most session was dedicated to letting patient expresses feeling and discussing the importance of  appropriate emotional responses from intrusive emotional responses.  • Given homework to write down in his book into columns: first column with emotional responses (of fear and anxiety) and in second column to write the triggers.  Discussed importance of finding common themes with triggers and these emotional responses.  • Psychoeducation provided to mother regarding importance of monitoring for the symptoms and how to deal with various strategies.  • Most session was dedicated to implementing supportive psychotherapy skills for patient and his mother.      CURRENT MEDICATIONS:  Current Outpatient Medications   Medication Sig Dispense Refill   • risperiDONE (RISPERDAL) 2 MG Tab Take 1 Tab by mouth 1 time daily as needed (for agitation or worsening psychosis). 30 Tab 0   • sertraline (ZOLOFT) 50 MG Tab Take 1 Tab by mouth every day. (half tab for first 6 days & then increase to one  full tab daily) 30 Tab 0   • aripiprazole (ABILIFY) 5 MG tablet Take 1 Tab by mouth every day. (half tab for first 6 days & then increase to one full tab daily) 30 Tab 0   • albuterol (PROVENTIL) 2.5mg/3ml NEBU solution for nebulization 3 mL by Nebulization route every four hours as needed for Shortness of Breath. 75 mL 0   • ADVAIR DISKUS INH Inhale  by mouth as needed.     • ALBUTEROL 90 MCG/ACT AERS Inhale 2 Puffs by mouth every 6 hours as needed for Shortness of Breath. 1 Inhaler 3     No current facility-administered medications for this visit.        MEDICAL HISTORY  Past Medical History:   Diagnosis Date   • Asthma    • Psychiatric disorder     anxiety and schizophrenia   • Suicidal ideations 8/17/2020     Past Surgical History:   Procedure Laterality Date   • ADENOIDECTOMY     • PB CREATE EARDRUM OPENING,GEN ANESTH      Ear tubes       PAST PSYCHIATRIC HISTORY  Prior psychiatric hospitalization: none  Prior Self harm/suicide attempt: no  Prior Diagnosis: none     PAST PSYCHIATRIC MEDICATIONS  none      FAMILY HISTORY  Psychiatric diagnosis: Maternal uncle with schizophrenia; mother with anxiety; maternal grandmother with depression  History of suicide attempts:  no  Substance abuse history:  no     SUBSTANCE USE HISTORY:  ALCOHOL: no  TOBACCO: no  CANNABIS: occasional: last use 2 months ago; make anxiety worse  OPIOIDS: no  PRESCRIPTION MEDICATIONS: no  OTHERS: no  History of inpatient/outpatient rehab treatment: no     SOCIAL HISTORY:  Education: Special education studies; IEP; received adjusted diploma in 2018  Employment: employed  Relationship: single  Kids: no  Current living situation: lives with family  Current/past legal issues: no  History of emotional/physical/sexual abuse - no      REVIEW OF SYSTEMS:        Constitutional negative   Eyes negative   Ears/Nose/Mouth/Throat negative   Cardiovascular negative   Respiratory negative   Gastrointestinal negative   Genitourinary negative   Muscular  negative   Integumentary negative   Neurological negative   Endocrine negative   Hematologic/Lymphatic negative     PHYSICAL EXAMINAION:  Vital signs: There were no vitals taken for this visit.  Musculoskeletal: Normal gait.   Abnormal movements: none      MENTAL STATUS EXAMINATION      General:   - Grooming and hygiene: Good,   - Apparent distress: none,   - Behavior: Calm  - Eye Contact:  Good,   - no psychomotor agitation or retardation    - Participation: Active verbal participation  Orientation: Alert and Fully Oriented to person, place and time  Mood: Euthymic  Affect: Flexible and Full range,  Thought Process: Logical and Goal-directed  Thought Content: Denies suicidal or homicidal ideations, intent or plan Within normal limits  Perception: Denies auditory or visual hallucinations. No delusions noted Within normal limits  Attention span and concentration: Intact   Speech:Rate within normal limits and Volume within normal limits  Language: Appropriate   Insight: Good  Judgment: Good  Recent and remote memory: No gross evidence of memory deficits        DEPRESSION SCREENING:  Depression Screen (PHQ-2/PHQ-9) 8/11/2020   PHQ-2 Total Score 5   PHQ-9 Total Score 10       Interpretation of PHQ-9 Total Score   Score Severity   1-4 No Depression   5-9 Mild Depression   10-14 Moderate Depression   15-19 Moderately Severe Depression   20-27 Severe Depression    CURRENT RISK:       Suicidal: Low       Homicidal: Low       Self-Harm: Low       Relapse: Low       Crisis Safety Plan Reviewed Not Indicated       If evidence of imminent risk is present, intervention/plan:      MEDICAL RECORDS/LABS/DIAGNOSTIC TESTS REVIEWED:  No new lab since last visit     [place]  records -   Reviewed       DIAGNOSTIC IMPRESSION(S):  1. paranoid schizophrenia  2. Generalized anxiety disorder  3. Major depressive disorder, moderate  4. Cannabis abuse        PLAN:  (1) Schizophrenia  · Slow improvment  · Continue Abilify 5 mg (moved to dinner  rather than morning time) daily for psychosis management and depression augmentation.  Given 1 month supply with no refill.  · utilize risperidone 2 mg as PRN for severe agitation or worsening psychotic symptoms. No prescription given: as patient have supply at home.  · Continue sychotherapy for mood and psychosis management.  · In next session give baseline lab work: CBC, CMP, TSH, HbA1c and Lipid Panel  · Medication options, alternatives (including no medications) and medication risks/benefits/side effects were discussed in detail.  · The patient was advised to call, message provider on [employer], or come in to the clinic if symptoms worsen or if any future questions/issues regarding their medications arise; the patient verbalized understanding and agreement.    · The patient was educated to call 911, call the suicide hotline, or go to local ER if having thoughts of suicide or homicide; verbalized understanding.     (2) Generalized anxiety disorder  · Slow improvment  · Continue Abilify 5 mg daily for psychosis management and depression augmentation.  Given 1 month supply with no refill.  · utilize risperidone 2 mg as PRN for severe agitation or worsening psychotic symptoms. No prescription given: as patient have supply at home.  · Conitnue Zoloft 50 mg daily for depression and anxiety management.  No prescription given: as patient have supply at home.  · Continue sychotherapy for mood and psychosis management.  · In next session give baseline lab work: CBC, CMP, TSH, HbA1c and Lipid Panel     (3) Major depressive disorder  · Slow improvment  · Continue Abilify 5 mg daily for psychosis management and depression augmentation.  Given 1 month supply with no refill.  · utilize risperidone 2 mg as PRN for severe agitation or worsening psychotic symptoms. No prescription given: as patient have supply at home.  · Conitnue Zoloft 50 mg daily for depression and anxiety management.  No prescription given: as patient have  supply at home.  · Continue sychotherapy for mood and psychosis management.  · In next session give baseline lab work: CBC, CMP, TSH, HbA1c and Lipid Panel       Return to clinic in 4 weeks or sooner if symptoms worsen.  Next Appointment: instruction provided on how to make the next appointment.     The proposed treatment plan was discussed with the patient who was provided the opportunity to ask questions and make suggestions regarding alternative treatment. Patient verbalized understanding and expressed agreement with the plan.       Garcia Armendariz M.D.  08/20/20    This note was created using voice recognition software (Dragon). The accuracy of the dictation is limited by the abilities of the software. I have reviewed the note prior to signing, however some errors in grammar and context are still possible. If you have any questions related to this note please do not hesitate to contact our office.

## 2020-08-21 ENCOUNTER — DOCUMENTATION (OUTPATIENT)
Dept: BEHAVIORAL HEALTH | Facility: CLINIC | Age: 20
End: 2020-08-21

## 2020-09-03 DIAGNOSIS — F41.1 GENERALIZED ANXIETY DISORDER: ICD-10-CM

## 2020-09-03 DIAGNOSIS — F32.1 CURRENT MODERATE EPISODE OF MAJOR DEPRESSIVE DISORDER WITHOUT PRIOR EPISODE (HCC): ICD-10-CM

## 2020-09-11 DIAGNOSIS — F20.0 PARANOID SCHIZOPHRENIA (HCC): ICD-10-CM

## 2020-09-11 RX ORDER — RISPERIDONE 2 MG/1
TABLET ORAL
Qty: 30 TAB | Refills: 0 | Status: SHIPPED | OUTPATIENT
Start: 2020-09-11 | End: 2022-01-21

## 2020-09-17 ENCOUNTER — OFFICE VISIT (OUTPATIENT)
Dept: BEHAVIORAL HEALTH | Facility: CLINIC | Age: 20
End: 2020-09-17
Payer: COMMERCIAL

## 2020-09-17 DIAGNOSIS — F41.1 GENERALIZED ANXIETY DISORDER: ICD-10-CM

## 2020-09-17 DIAGNOSIS — F20.0 PARANOID SCHIZOPHRENIA (HCC): ICD-10-CM

## 2020-09-17 DIAGNOSIS — F32.1 CURRENT MODERATE EPISODE OF MAJOR DEPRESSIVE DISORDER WITHOUT PRIOR EPISODE (HCC): ICD-10-CM

## 2020-09-17 PROCEDURE — 99213 OFFICE O/P EST LOW 20 MIN: CPT | Performed by: PSYCHIATRY & NEUROLOGY

## 2020-09-17 PROCEDURE — 90833 PSYTX W PT W E/M 30 MIN: CPT | Performed by: PSYCHIATRY & NEUROLOGY

## 2020-09-17 RX ORDER — ARIPIPRAZOLE 5 MG/1
5 TABLET ORAL DAILY
Qty: 60 TAB | Refills: 0 | Status: SHIPPED | OUTPATIENT
Start: 2020-09-17 | End: 2020-10-29 | Stop reason: SDUPTHER

## 2020-10-29 ENCOUNTER — OFFICE VISIT (OUTPATIENT)
Dept: BEHAVIORAL HEALTH | Facility: CLINIC | Age: 20
End: 2020-10-29
Payer: COMMERCIAL

## 2020-10-29 DIAGNOSIS — F41.1 GENERALIZED ANXIETY DISORDER: ICD-10-CM

## 2020-10-29 DIAGNOSIS — F20.0 PARANOID SCHIZOPHRENIA (HCC): Primary | ICD-10-CM

## 2020-10-29 DIAGNOSIS — F32.1 CURRENT MODERATE EPISODE OF MAJOR DEPRESSIVE DISORDER WITHOUT PRIOR EPISODE (HCC): ICD-10-CM

## 2020-10-29 DIAGNOSIS — F32.4 MAJOR DEPRESSIVE DISORDER WITH SINGLE EPISODE, IN PARTIAL REMISSION (HCC): ICD-10-CM

## 2020-10-29 PROCEDURE — 99213 OFFICE O/P EST LOW 20 MIN: CPT | Performed by: PSYCHIATRY & NEUROLOGY

## 2020-10-29 RX ORDER — ARIPIPRAZOLE 5 MG/1
5 TABLET ORAL DAILY
Qty: 90 TAB | Refills: 1 | Status: SHIPPED | OUTPATIENT
Start: 2020-10-29 | End: 2021-01-27

## 2020-10-29 NOTE — PROGRESS NOTES
"PSYCHIATRY FOLLOW-UP NOTE      Name: Mckay Del Rosario  MRN: [id number]  : 2000  Age: 20 y.o.  Date of assessment: 10/29/2020  PCP: Karen Celis P.A.-C.  Persons in attendance: Patient  Total face-to-face time: 15 minutes    REASON FOR VISIT/CHIEF COMPLAINT (as stated by Patient):  Mckay Del Rosario is a 20 y.o., White male, attending follow-up appointment for mood and anxiety management.      HISTORY OF PRESENT ILLNESS:  Mckay Del Rosario is a 20 y.o. old male with schizophrenia and anxiety comes in today for follow up. Patient was last seen 6 weeks ago, and following treatment planning recommendations were done:  · Continue Abilify 5 mg (moved to dinner rather than morning time) daily for psychosis management and depression augmentation.  Given 2 month supply with no refill.  · utilize risperidone 2 mg as PRN for severe agitation or worsening psychotic symptoms. No prescription given: as patient have supply at home.  · Continue sychotherapy for mood and psychosis management.  · In next session give baseline lab work: CBC, CMP, TSH, HbA1c and Lipid Panel    Patient is compliant with medications with no side effect.  Patient reports not needing risperidone as needed since last visit and is taking Abilify and Zoloft on a daily basis after dinner with no side effect.  Patient reports restarting his job 1 month ago and is doing well with no high anxiety or any paranoia.  Patient reports not having auditory hallucination or any other psychotic symptom and does not appear preoccupied or paranoid during entire evaluation.  I also performed basic examination and no signs of abnormal movements or muscle rigidity noted.    I also spoke with mom mother alone who agreed that he is doing much better and is \" back to himself now\".  We again discussed the importance of monitoring for triggers and based on his response we will decide the dosage of Abilify.  Family is making sure that he is compliant " with medication.  Mother agrees that he is doing well at work and outside work.  Agreed with plan of changing follow-up duration to every 3 monthly now.    Patient is motivated to continue psychotherapy in the clinic.    CURRENT MEDICATIONS:  Current Outpatient Medications   Medication Sig Dispense Refill   • aripiprazole (ABILIFY) 5 MG tablet Take 1 Tab by mouth every day for 60 days. 60 Tab 0   • sertraline (ZOLOFT) 50 MG Tab Take 1 Tab by mouth every day for 60 days. 60 Tab 0   • risperiDONE (RISPERDAL) 2 MG Tab TAKE 1 TABLET BY MOUTH EVERY DAY AS NEEDED FOR AGITATION OR WORSENING PSYCHOSIS 30 Tab 0   • albuterol (PROVENTIL) 2.5mg/3ml NEBU solution for nebulization 3 mL by Nebulization route every four hours as needed for Shortness of Breath. 75 mL 0   • ADVAIR DISKUS INH Inhale  by mouth as needed.     • ALBUTEROL 90 MCG/ACT AERS Inhale 2 Puffs by mouth every 6 hours as needed for Shortness of Breath. 1 Inhaler 3     No current facility-administered medications for this visit.        MEDICAL HISTORY  Past Medical History:   Diagnosis Date   • Asthma    • Psychiatric disorder     anxiety and schizophrenia   • Suicidal ideations 8/17/2020     Past Surgical History:   Procedure Laterality Date   • ADENOIDECTOMY     • PB CREATE EARDRUM OPENING,GEN ANESTH      Ear tubes       PAST PSYCHIATRIC HISTORY  Prior psychiatric hospitalization: none  Prior Self harm/suicide attempt: no  Prior Diagnosis: none     PAST PSYCHIATRIC MEDICATIONS  none      FAMILY HISTORY  Psychiatric diagnosis: Maternal uncle with schizophrenia; mother with anxiety; maternal grandmother with depression  History of suicide attempts:  no  Substance abuse history:  no     SUBSTANCE USE HISTORY:  ALCOHOL: no  TOBACCO: no  CANNABIS: occasional: last use 2 months ago; make anxiety worse  OPIOIDS: no  PRESCRIPTION MEDICATIONS: no  OTHERS: no  History of inpatient/outpatient rehab treatment: no     SOCIAL HISTORY:  Education: Special education studies;  IEP; received adjusted diploma in 2018  Employment: employed  Relationship: single  Kids: no  Current living situation: lives with family  Current/past legal issues: no  History of emotional/physical/sexual abuse - no      REVIEW OF SYSTEMS:        Constitutional negative   Eyes negative   Ears/Nose/Mouth/Throat negative   Cardiovascular negative   Respiratory negative   Gastrointestinal negative   Genitourinary negative   Muscular negative   Integumentary negative   Neurological negative   Endocrine negative   Hematologic/Lymphatic negative     PHYSICAL EXAMINAION:  Vital signs: There were no vitals taken for this visit.  Musculoskeletal: Normal gait.   Abnormal movements: none      MENTAL STATUS EXAMINATION      General:   - Grooming and hygiene: Casual,   - Apparent distress: none,   - Behavior: Calm  - Eye Contact:  Good,   - no psychomotor agitation or retardation    - Participation: Active verbal participation  Orientation: Alert and Fully Oriented to person, place and time  Mood: Euthymic  Affect: Flexible and Full range,  Thought Process: Logical and Goal-directed  Thought Content: Denies suicidal or homicidal ideations, intent or plan Within normal limits  Perception: Denies auditory or visual hallucinations. No delusions noted Within normal limits  Attention span and concentration: Intact   Speech:Rate within normal limits and Volume within normal limits  Language: Appropriate   Insight: Good  Judgment: Good  Recent and remote memory: No gross evidence of memory deficits        DEPRESSION SCREENING:  Depression Screen (PHQ-2/PHQ-9) 8/11/2020   PHQ-2 Total Score 5   PHQ-9 Total Score 10       Interpretation of PHQ-9 Total Score   Score Severity   1-4 No Depression   5-9 Mild Depression   10-14 Moderate Depression   15-19 Moderately Severe Depression   20-27 Severe Depression    CURRENT RISK:       Suicidal: Low       Homicidal: Low       Self-Harm: Low       Relapse: Low       Crisis Safety Plan Reviewed  Not Indicated       If evidence of imminent risk is present, intervention/plan:      MEDICAL RECORDS/LABS/DIAGNOSTIC TESTS REVIEWED:  No new lab since last visit     [place] [facility] records -   Reviewed       DIAGNOSTIC IMPRESSION(S):  1. paranoid schizophrenia  2. Generalized anxiety disorder  3. Major depressive disorder, moderate  4. Cannabis abuse        PLAN:  (1) Schizophrenia  · Improving  · Continue Abilify 5 mg after dinner for psychosis management and depression augmentation.  Given 3 month supply with 1 refill.  · Utilize risperidone 2 mg as PRN for severe agitation or worsening psychotic symptoms. No prescription given: as patient have supply at home.  · Continue sychotherapy for mood and psychosis management.  · Check baseline labs: CBC, CMP, TSH, HbA1c and Lipid Panel  · Medication options, alternatives (including no medications) and medication risks/benefits/side effects were discussed in detail.  · The patient was advised to call, message provider on [employer], or come in to the clinic if symptoms worsen or if any future questions/issues regarding their medications arise; the patient verbalized understanding and agreement.    · The patient was educated to call 911, call the suicide hotline, or go to local ER if having thoughts of suicide or homicide; verbalized understanding.     (2) Generalized anxiety disorder  · Stable  · Continue Abilify 5 mg after dinner for psychosis management and depression augmentation.  Given 3 month supply with 1 refill.  · utilize risperidone 2 mg as PRN for severe agitation or worsening psychotic symptoms. No prescription given: as patient have supply at home.  · Conitnue Zoloft 50 mg daily for depression and anxiety management.  Given 3 month supply with 1 refill.  · Continue sychotherapy for mood and psychosis management.  · Check baseline labs: CBC, CMP, TSH, HbA1c and Lipid Panel     (3) Major depressive disorder  · Stable  · Continue Abilify 5 mg after dinner for psychosis  management and depression augmentation.  Given 3 month supply with 1 refill.  · utilize risperidone 2 mg as PRN for severe agitation or worsening psychotic symptoms. No prescription given: as patient have supply at home.  · Conitnue Zoloft 50 mg daily for depression and anxiety management.  Given 3 month supply with 1 refill.  · Continue sychotherapy for mood and psychosis management.  · Check baseline labs: CBC, CMP, TSH, HbA1c and Lipid Panel       Return to clinic in 3 months or sooner if symptoms worsen.  Next Appointment: instruction provided on how to make the next appointment.     The proposed treatment plan was discussed with the patient who was provided the opportunity to ask questions and make suggestions regarding alternative treatment. Patient verbalized understanding and expressed agreement with the plan.       Garcia Armendariz M.D.  10/29/20    This note was created using voice recognition software (Dragon). The accuracy of the dictation is limited by the abilities of the software. I have reviewed the note prior to signing, however some errors in grammar and context are still possible. If you have any questions related to this note please do not hesitate to contact our office.

## 2020-11-02 ENCOUNTER — OFFICE VISIT (OUTPATIENT)
Dept: BEHAVIORAL HEALTH | Facility: CLINIC | Age: 20
End: 2020-11-02
Payer: COMMERCIAL

## 2020-11-02 DIAGNOSIS — F20.0 PARANOID SCHIZOPHRENIA (HCC): ICD-10-CM

## 2020-11-02 DIAGNOSIS — F41.1 GENERALIZED ANXIETY DISORDER: ICD-10-CM

## 2020-11-02 PROCEDURE — 90791 PSYCH DIAGNOSTIC EVALUATION: CPT | Performed by: PSYCHOLOGIST

## 2020-11-02 NOTE — BH THERAPY
[facility]  INITIAL ASSESSMENT    Name: Mckay Del Rosario  MRN: [id number]  : 2000  Age: 20 y.o.  Date of assessment: 2020  PCP: Karen Celis P.A.-C.  Persons in attendance: Patient  Total session time: 38 minutes      CHIEF COMPLAINT AND HISTORY OF PRESENTING PROBLEM:  (as stated by Patient):  Mckay Del Rosario is a 20 y.o., White male referred for assessment by Self-Referred, Patient.  Primary presenting issue includes   Chief Complaint   Patient presents with   • Stress   • Anxiety     SUBJECTIVE:    HISTORY OF PRESENT ILLNESS: The patient comes in today to establish psychotherapy after being diagnosed with paranoid schizophrenia and generalized anxiety disorder in 2020.  When the patient initially presented to the clinic in August he was experiencing worsening of paranoid ideation.  However with the current psychotropic medication the patient denies auditory hallucinations and delusional thought.      At the time of his initial presentation, the patient believes that everyone is out to get him.  Patient also believed that some people are hacking his phone and PlayStation.  He frequently believes that most people hates him.  According to patient and family that this began last year in October but it was mild but it has increased very quickly over last few months with patient not able to perform well in his job as he believes one specific person believes him and target symptoms specifically.  Patient denies auditory or visual hallucination but family gives many examples consistent with new onset of paranoid delusions.  Family was concerned that he will get angry and may believe family is against him as last Friday he was questioning his family members questions for him.    Patient also report increased anxiety due to this belief and reports worrying about many things and have difficulty controlling his worries with associated muscle tension and him feeling easily  irritated or frustrated.      Patient reports feeling depressed for these believes and reports feeling down with decline in interest with feelings of guilt and poor concentration but denies endorsing suicidal or homicidal ideations.  Patient denies meeting criteria for current or past history of hypomania or fadumo.  In addition he denies symptoms of OCD, denies restrictive eating or purging, denies trauma related symptoms.  Even though his depressive symptoms have been managed since August the patient continues to have mild symptoms associated with anxiety and depression.    RELEVANT HISTORY:    SOCIAL HISTORY: The patient is unaware of the marital status of his parents.  The patient reports distant relationship with his father and is currently living with his mother.  The patient denies a history of abuse and neglect and has no siblings.    The patient denies a history of current use of alcohol he does report one time use of cannabis which exacerbated psychotic symptoms.  The patient denies the use of other illicit drugs.    EDUCATIONAL HISTORY: The patient reports that he graduated high school he indicates that he did receive special education services as a student with a learning disability starting in elementary school.  The patient denies a history of behavioral disturbances.    OCCUPATIONAL HISTORY: Patient reported when he graduated high school he did participate in job core for soft skills training.  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  The patient is currently not a vocational rehabilitation client.    LEGAL HISTORY: Denies    PAST PSYCHIATRIC HISTORY: The patient denies a history of psychiatric hospitalization, suicide attempt, but does report some self-injurious behavior.  The patient was diagnosed in August 2020 with paranoid schizophrenia and generalized anxiety disorder patient is currently prescribed Abilify, Zoloft, and  Risperdal as needed for agitation.  He denies a history of psychotherapy.    FAMILY PSYCHIATRIC HISTORY: The patient reports a maternal uncle with a diagnosis of schizophrenia.    MEDICAL HISTORY    History of head trauma: No  History of seizure: No  History of neurological concerns: No  Past medical/surgical history:   Past Medical History:   Diagnosis Date   • Asthma    • Psychiatric disorder     anxiety and schizophrenia   • Suicidal ideations 8/17/2020      Past Surgical History:   Procedure Laterality Date   • ADENOIDECTOMY     • PB CREATE EARDRUM OPENING,GEN ANESTH      Ear tubes      Current Outpatient Medications on File Prior to Visit   Medication Sig Dispense Refill   • sertraline (ZOLOFT) 50 MG Tab Take 1 Tab by mouth every day for 90 days. 90 Tab 1   • ARIPiprazole (ABILIFY) 5 MG tablet Take 1 Tab by mouth every day for 90 days. 90 Tab 1   • risperiDONE (RISPERDAL) 2 MG Tab TAKE 1 TABLET BY MOUTH EVERY DAY AS NEEDED FOR AGITATION OR WORSENING PSYCHOSIS 30 Tab 0   • albuterol (PROVENTIL) 2.5mg/3ml NEBU solution for nebulization 3 mL by Nebulization route every four hours as needed for Shortness of Breath. 75 mL 0   • ADVAIR DISKUS INH Inhale  by mouth as needed.     • ALBUTEROL 90 MCG/ACT AERS Inhale 2 Puffs by mouth every 6 hours as needed for Shortness of Breath. 1 Inhaler 3     No current facility-administered medications on file prior to visit.      Medication Allergies:  Patient has no known allergies.      SAFETY ASSESSMENT - SELF    Current Suicide Risk: Low  Crisis Safety Plan completed and copy given to patient: No    Current Homicide Risk:  Not applicable  Crisis Safety Plan completed and copy given to patient? No  Based on information provided during the current assessment, is a mandated “duty to warn” being exercised? No    Risk Level: Not Currently at Clinically Significant Risk  Hospitalization is not deemed necessary at this time as the patient does not present a clear or imminent danger to  self or others. No indication for pursuing higher level of care. Outpatient management is currently most appropriate and least restrictive level of care.     OBJECTIVE:    MENTAL STATUS/OBSERVATIONS    Patient did not present in acute distress. Patient was appropriately groomed. Patient was alert and oriented x4. Eye contact was appropriate. No abnormalities in attention or concentration were noted. No abnormalities of movement present; psychomotor activity was normal. Speech was fluent and regular in rhythm, rate, volume, and tone. Thought processes linear and logical. There was no evidence of thought disorder. No auditory or visual hallucinations. Long and short term memory appeared to be intact. Insight, judgment, and impulse control were deemed to be fair.  Reported mood was “at ease.” Affect was full-ranging and appropriate to thought content and conversation.  Patient denied current suicidal and homicidal ideation in plan, intent, and preparatory behavior.      DIAGNOSTIC IMPRESSION(S):  1. Paranoid schizophrenia (HCC)    2. Generalized anxiety disorder        PLAN:    IDENTIFIED NEEDS/PLAN:  [If any of these marked, trigger DISPOSITION list]  Psychosis/Impaired reality testing   Refer to [facility]: Outpatient Therapy    1) The patient will return to the clinic 1 week.  2) Crisis Response Plan:  Reviewed emergency resources with the patient and the patient expressed understanding including:  If feeling suicidal, patient will call or present to the [facility]al Health Clinic during duty hours or present to closest ED ([facility] or [facility], call 911 or crisis hotline ([phone]-REZX) after duty hours.  3) Referrals/Consults:  N/A  4) Barriers to Learning:  No  5) Readiness to Learn:  Yes  6) Cultural Concerns:  No  7) Patient voiced understanding of, and agreement with, plan and goals as annotated above. Yes   8) Declare these services are medically  necessary and appropriate to the patient's diagnosis and needs  9) The point of contact at the Clinic regarding this evaluation is Dr. Roger, Psychologist.    Tono Roger III, Usman.  Psychologist  [place] [id number]

## 2021-02-04 ENCOUNTER — TELEMEDICINE (OUTPATIENT)
Dept: BEHAVIORAL HEALTH | Facility: CLINIC | Age: 21
End: 2021-02-04
Payer: COMMERCIAL

## 2021-02-04 DIAGNOSIS — F20.0 PARANOID SCHIZOPHRENIA (HCC): ICD-10-CM

## 2021-02-04 DIAGNOSIS — F41.1 GENERALIZED ANXIETY DISORDER: ICD-10-CM

## 2021-02-04 DIAGNOSIS — F32.5 MAJOR DEPRESSIVE DISORDER WITH SINGLE EPISODE, IN FULL REMISSION (HCC): ICD-10-CM

## 2021-02-04 PROCEDURE — 99214 OFFICE O/P EST MOD 30 MIN: CPT | Mod: 95,CR | Performed by: PSYCHIATRY & NEUROLOGY

## 2021-02-04 RX ORDER — ARIPIPRAZOLE 5 MG/1
5 TABLET ORAL DAILY
Qty: 90 TAB | Refills: 1 | Status: SHIPPED | OUTPATIENT
Start: 2021-02-04 | End: 2021-10-01 | Stop reason: SDUPTHER

## 2021-02-04 ASSESSMENT — ANXIETY QUESTIONNAIRES
GAD7 TOTAL SCORE: 0
2. NOT BEING ABLE TO STOP OR CONTROL WORRYING: NOT AT ALL
1. FEELING NERVOUS, ANXIOUS, OR ON EDGE: NOT AT ALL
5. BEING SO RESTLESS THAT IT IS HARD TO SIT STILL: NOT AT ALL
6. BECOMING EASILY ANNOYED OR IRRITABLE: NOT AT ALL
3. WORRYING TOO MUCH ABOUT DIFFERENT THINGS: NOT AT ALL
7. FEELING AFRAID AS IF SOMETHING AWFUL MIGHT HAPPEN: NOT AT ALL
4. TROUBLE RELAXING: NOT AT ALL

## 2021-02-04 ASSESSMENT — PATIENT HEALTH QUESTIONNAIRE - PHQ9: CLINICAL INTERPRETATION OF PHQ2 SCORE: 0

## 2021-02-04 NOTE — PROGRESS NOTES
This evaluation was conducted via Zoom using secure and encrypted videoconferencing technology. The patient was in a private location in the state of [place].    The patient's identity was confirmed and verbal consent was obtained for this virtual visit.     PSYCHIATRY FOLLOW-UP NOTE      Name: Mckay Del Rosario  MRN: [id number]  : 2000  Age: 20 y.o.  Date of assessment: 2021  PCP: Karen Celis P.A.-C.  Persons in attendance: Patient  Total face-to-face time: 15 minutes  Time for documentation and reviewing past records:  10 minutes    REASON FOR VISIT/CHIEF COMPLAINT (as stated by Patient):  Mckay Del Rosario is a 20 y.o., White male, attending follow-up appointment for mood and anxiety management.      HISTORY OF PRESENT ILLNESS:  Mckay Del Rosario is a 20 y.o. old male with schizophrenia, MDD and HUGO comes in today for follow up. Patient was last seen 3 months ago, and following treatment planning recommendations were done:  · Continue Abilify 5 mg after dinner for psychosis management and depression augmentation.  Given 3 month supply with 1 refill.  · utilize risperidone 2 mg as PRN for severe agitation or worsening psychotic symptoms. No prescription given: as patient have supply at home.  · Conitnue Zoloft 50 mg daily for depression and anxiety management.  Given 3 month supply with 1 refill.  · Continue sychotherapy for mood and psychosis management.  · Check baseline labs: CBC, CMP, TSH, HbA1c and Lipid Panel    Patient is compliant with medications with no side effects including abnormal movements, muscle stiffness or weight gain.  Patient denies any need for as needed risperidone and is only taking Abilify and Zoloft on a daily basis.  Patient denies any signs of depression or anxiety.  His PHQ 2 score is 0 and HUGO 7 score is 0.  Patient is on Zoloft for 5 months now and agreed with plan of continuing Zoloft for additional 3 months and then will consider gradual taper if  patient continues to remain stable from mood and anxiety standpoint.  Patient reports doing well at work and is making new friends with no signs of psychotic symptoms.  Patient denies endorsing auditory or visual hallucination or any signs of delusions.  Patient was again motivated to get the basic metabolic lab work so that we can follow and make changes and side effect from Abilify.    He was also motivated to continue psychotherapy in the clinic.    CURRENT MEDICATIONS:  Current Outpatient Medications   Medication Sig Dispense Refill   • risperiDONE (RISPERDAL) 2 MG Tab TAKE 1 TABLET BY MOUTH EVERY DAY AS NEEDED FOR AGITATION OR WORSENING PSYCHOSIS 30 Tab 0   • albuterol (PROVENTIL) 2.5mg/3ml NEBU solution for nebulization 3 mL by Nebulization route every four hours as needed for Shortness of Breath. 75 mL 0   • ADVAIR DISKUS INH Inhale  by mouth as needed.     • ALBUTEROL 90 MCG/ACT AERS Inhale 2 Puffs by mouth every 6 hours as needed for Shortness of Breath. 1 Inhaler 3     No current facility-administered medications for this visit.        MEDICAL HISTORY  Past Medical History:   Diagnosis Date   • Asthma    • Psychiatric disorder     anxiety and schizophrenia   • Suicidal ideations 8/17/2020     Past Surgical History:   Procedure Laterality Date   • ADENOIDECTOMY     • PB CREATE EARDRUM OPENING,GEN ANESTH      Ear tubes       PAST PSYCHIATRIC HISTORY  Prior psychiatric hospitalization: none  Prior Self harm/suicide attempt: no  Prior Diagnosis: none     PAST PSYCHIATRIC MEDICATIONS  none      FAMILY HISTORY  Psychiatric diagnosis: Maternal uncle with schizophrenia; mother with anxiety; maternal grandmother with depression  History of suicide attempts:  no  Substance abuse history:  no     SUBSTANCE USE HISTORY:  ALCOHOL: no  TOBACCO: no  CANNABIS: occasional: last use 2 months ago; make anxiety worse  OPIOIDS: no  PRESCRIPTION MEDICATIONS: no  OTHERS: no  History of inpatient/outpatient rehab  treatment: no     SOCIAL HISTORY:  Education: Special education studies; IEP; received adjusted diploma in 2018  Employment: employed  Relationship: single  Kids: no  Current living situation: lives with family  Current/past legal issues: no  History of emotional/physical/sexual abuse - no    REVIEW OF SYSTEMS:        Constitutional negative   Eyes negative   Ears/Nose/Mouth/Throat negative   Cardiovascular negative   Respiratory negative   Gastrointestinal negative   Genitourinary negative   Muscular negative   Integumentary negative   Neurological negative   Endocrine negative   Hematologic/Lymphatic negative     PHYSICAL EXAMINAION:  Vital signs: There were no vitals taken for this visit.  Musculoskeletal: Normal gait.   Abnormal movements: none      MENTAL STATUS EXAMINATION      General:   - Grooming and hygiene: Casual,   - Apparent distress: none,   - Behavior: Calm  - Eye Contact:  Good,   - no psychomotor agitation or retardation    - Participation: Active verbal participation  Orientation: Alert and Fully Oriented to person, place and time  Mood: Euthymic  Affect: Flexible and Full range,  Thought Process: Logical and Goal-directed  Thought Content: Denies suicidal or homicidal ideations, intent or plan Within normal limits  Perception: Denies auditory or visual hallucinations. No delusions noted Within normal limits  Attention span and concentration: Intact   Speech:Rate within normal limits and Volume within normal limits  Language: Appropriate   Insight: Good  Judgment: Good  Recent and remote memory: No gross evidence of memory deficits        DEPRESSION SCREENING:  Depression Screen (PHQ-2/PHQ-9) 8/11/2020   PHQ-2 Total Score 5   PHQ-9 Total Score 10       Interpretation of PHQ-9 Total Score   Score Severity   1-4 No Depression   5-9 Mild Depression   10-14 Moderate Depression   15-19 Moderately Severe Depression   20-27 Severe Depression    CURRENT RISK:       Suicidal: Low       Homicidal: Low        Self-Harm: Low       Relapse: Low       Crisis Safety Plan Reviewed Not Indicated       If evidence of imminent risk is present, intervention/plan:      MEDICAL RECORDS/LABS/DIAGNOSTIC TESTS REVIEWED:  No new lab since last visit     [facility] records -   Reviewed     DIAGNOSTIC IMPRESSION(S):  1. paranoid schizophrenia  2. Generalized anxiety disorder  3. Major depressive disorder, moderate  4. Cannabis abuse        PLAN:  (1) Schizophrenia  · Improving  · Continue Abilify 5 mg after dinner for psychosis management and depression augmentation.  Given 3 month supply with 1 refill.  · Utilize risperidone 2 mg as PRN for severe agitation or worsening psychotic symptoms. No prescription given: as patient have supply at home.  · Continue sychotherapy for mood and psychosis management.  · Check baseline labs: CBC, CMP, TSH, HbA1c and Lipid Panel  · Medication options, alternatives (including no medications) and medication risks/benefits/side effects were discussed in detail.  · The patient was advised to call, message provider on PROTEGO[facility], or come in to the clinic if symptoms worsen or if any future questions/issues regarding their medications arise; the patient verbalized understanding and agreement.    · The patient was educated to call 911, call the suicide hotline, or go to local ER if having thoughts of suicide or homicide; verbalized understanding.     (2) Generalized anxiety disorder  · Stable. GAD7: 0  · Continue Abilify 5 mg after dinner for psychosis management and depression augmentation.  Given 3 month supply with 1 refill.  · utilize risperidone 2 mg as PRN for severe agitation or worsening psychotic symptoms. No prescription given: as patient have supply at home.  · Conitnue Zoloft 50 mg daily for depression and anxiety management.  Given 3 month supply with 1 refill.  · Continue sychotherapy for mood and psychosis management.  · Check baseline labs: CBC, CMP, TSH, HbA1c and Lipid Panel     (3) Major depressive  disorder  · Stable: PHQ2: 0  · Continue Abilify 5 mg after dinner for psychosis management and depression augmentation.  Given 3 month supply with 1 refill.  · utilize risperidone 2 mg as PRN for severe agitation or worsening psychotic symptoms. No prescription given: as patient have supply at home.  · Conitnue Zoloft 50 mg daily for depression and anxiety management.  Given 3 month supply with 1 refill.  · Continue sychotherapy for mood and psychosis management.  · Check baseline labs: CBC, CMP, TSH, HbA1c and Lipid Panel     MDM: 84700: based on management of three psychiatric conditions, medication management and laboratory monitoring for metabolic side effects of antipsychotic medication.    Return to clinic in 3 months or sooner if symptoms worsen.  Next Appointment: instruction provided on how to make the next appointment.     The proposed treatment plan was discussed with the patient who was provided the opportunity to ask questions and make suggestions regarding alternative treatment. Patient verbalized understanding and expressed agreement with the plan.       Garcia Armendariz M.D.  02/04/21    This note was created using voice recognition software (Dragon). The accuracy of the dictation is limited by the abilities of the software. I have reviewed the note prior to signing, however some errors in grammar and context are still possible. If you have any questions related to this note please do not hesitate to contact our office.

## 2021-02-06 ENCOUNTER — APPOINTMENT (OUTPATIENT)
Dept: RADIOLOGY | Facility: MEDICAL CENTER | Age: 21
End: 2021-02-06
Attending: EMERGENCY MEDICINE
Payer: COMMERCIAL

## 2021-02-06 ENCOUNTER — HOSPITAL ENCOUNTER (EMERGENCY)
Facility: MEDICAL CENTER | Age: 21
End: 2021-02-06
Attending: EMERGENCY MEDICINE
Payer: COMMERCIAL

## 2021-02-06 VITALS
TEMPERATURE: 97.1 F | BODY MASS INDEX: 24.58 KG/M2 | HEIGHT: 72 IN | WEIGHT: 181.44 LBS | RESPIRATION RATE: 18 BRPM | OXYGEN SATURATION: 98 % | SYSTOLIC BLOOD PRESSURE: 145 MMHG | HEART RATE: 55 BPM | DIASTOLIC BLOOD PRESSURE: 72 MMHG

## 2021-02-06 DIAGNOSIS — R11.2 NON-INTRACTABLE VOMITING WITH NAUSEA, UNSPECIFIED VOMITING TYPE: ICD-10-CM

## 2021-02-06 LAB
ALBUMIN SERPL BCP-MCNC: 4.8 G/DL (ref 3.2–4.9)
ALBUMIN/GLOB SERPL: 1.8 G/DL
ALP SERPL-CCNC: 84 U/L (ref 30–99)
ALT SERPL-CCNC: 41 U/L (ref 2–50)
ANION GAP SERPL CALC-SCNC: 11 MMOL/L (ref 7–16)
AST SERPL-CCNC: 29 U/L (ref 12–45)
BASOPHILS # BLD AUTO: 0.5 % (ref 0–1.8)
BASOPHILS # BLD: 0.04 K/UL (ref 0–0.12)
BILIRUB SERPL-MCNC: 0.4 MG/DL (ref 0.1–1.5)
BUN SERPL-MCNC: 13 MG/DL (ref 8–22)
CALCIUM SERPL-MCNC: 10 MG/DL (ref 8.4–10.2)
CHLORIDE SERPL-SCNC: 102 MMOL/L (ref 96–112)
CO2 SERPL-SCNC: 26 MMOL/L (ref 20–33)
CREAT SERPL-MCNC: 0.9 MG/DL (ref 0.5–1.4)
EOSINOPHIL # BLD AUTO: 0.24 K/UL (ref 0–0.51)
EOSINOPHIL NFR BLD: 2.9 % (ref 0–6.9)
ERYTHROCYTE [DISTWIDTH] IN BLOOD BY AUTOMATED COUNT: 38.3 FL (ref 35.9–50)
GLOBULIN SER CALC-MCNC: 2.7 G/DL (ref 1.9–3.5)
GLUCOSE SERPL-MCNC: 111 MG/DL (ref 65–99)
HCT VFR BLD AUTO: 49.6 % (ref 42–52)
HGB BLD-MCNC: 16.3 G/DL (ref 14–18)
IMM GRANULOCYTES # BLD AUTO: 0.02 K/UL (ref 0–0.11)
IMM GRANULOCYTES NFR BLD AUTO: 0.2 % (ref 0–0.9)
LIPASE SERPL-CCNC: 33 U/L (ref 7–58)
LYMPHOCYTES # BLD AUTO: 2 K/UL (ref 1–4.8)
LYMPHOCYTES NFR BLD: 24.3 % (ref 22–41)
MCH RBC QN AUTO: 25.2 PG (ref 27–33)
MCHC RBC AUTO-ENTMCNC: 32.9 G/DL (ref 33.7–35.3)
MCV RBC AUTO: 76.7 FL (ref 81.4–97.8)
MONOCYTES # BLD AUTO: 0.39 K/UL (ref 0–0.85)
MONOCYTES NFR BLD AUTO: 4.7 % (ref 0–13.4)
NEUTROPHILS # BLD AUTO: 5.53 K/UL (ref 1.82–7.42)
NEUTROPHILS NFR BLD: 67.4 % (ref 44–72)
NRBC # BLD AUTO: 0 K/UL
NRBC BLD-RTO: 0 /100 WBC
PLATELET # BLD AUTO: 214 K/UL (ref 164–446)
PMV BLD AUTO: 11.3 FL (ref 9–12.9)
POTASSIUM SERPL-SCNC: 3.9 MMOL/L (ref 3.6–5.5)
PROT SERPL-MCNC: 7.5 G/DL (ref 6–8.2)
RBC # BLD AUTO: 6.47 M/UL (ref 4.7–6.1)
SODIUM SERPL-SCNC: 139 MMOL/L (ref 135–145)
WBC # BLD AUTO: 8.2 K/UL (ref 4.8–10.8)

## 2021-02-06 PROCEDURE — 83690 ASSAY OF LIPASE: CPT

## 2021-02-06 PROCEDURE — 96374 THER/PROPH/DIAG INJ IV PUSH: CPT

## 2021-02-06 PROCEDURE — 36415 COLL VENOUS BLD VENIPUNCTURE: CPT

## 2021-02-06 PROCEDURE — 700111 HCHG RX REV CODE 636 W/ 250 OVERRIDE (IP): Performed by: EMERGENCY MEDICINE

## 2021-02-06 PROCEDURE — 700105 HCHG RX REV CODE 258: Performed by: EMERGENCY MEDICINE

## 2021-02-06 PROCEDURE — 99284 EMERGENCY DEPT VISIT MOD MDM: CPT

## 2021-02-06 PROCEDURE — 74018 RADEX ABDOMEN 1 VIEW: CPT

## 2021-02-06 PROCEDURE — 85025 COMPLETE CBC W/AUTO DIFF WBC: CPT

## 2021-02-06 PROCEDURE — 80053 COMPREHEN METABOLIC PANEL: CPT

## 2021-02-06 RX ORDER — ONDANSETRON 2 MG/ML
4 INJECTION INTRAMUSCULAR; INTRAVENOUS ONCE
Status: COMPLETED | OUTPATIENT
Start: 2021-02-06 | End: 2021-02-06

## 2021-02-06 RX ORDER — MORPHINE SULFATE 4 MG/ML
4 INJECTION, SOLUTION INTRAMUSCULAR; INTRAVENOUS ONCE
Status: DISCONTINUED | OUTPATIENT
Start: 2021-02-06 | End: 2021-02-06

## 2021-02-06 RX ORDER — ONDANSETRON 4 MG/1
4 TABLET, ORALLY DISINTEGRATING ORAL EVERY 8 HOURS PRN
Qty: 10 TAB | Refills: 0 | Status: SHIPPED | OUTPATIENT
Start: 2021-02-06 | End: 2022-08-19

## 2021-02-06 RX ORDER — SODIUM CHLORIDE 9 MG/ML
1000 INJECTION, SOLUTION INTRAVENOUS ONCE
Status: COMPLETED | OUTPATIENT
Start: 2021-02-06 | End: 2021-02-06

## 2021-02-06 RX ADMIN — SODIUM CHLORIDE 1000 ML: 9 INJECTION, SOLUTION INTRAVENOUS at 07:27

## 2021-02-06 RX ADMIN — ONDANSETRON 4 MG: 2 INJECTION INTRAMUSCULAR; INTRAVENOUS at 07:27

## 2021-02-06 NOTE — ED NOTES
Pt given d/c paperwork and RX p/u information, pt verbalized understanding all information given. Pt ambulated out of the ER w/o difficulty w/ mother.

## 2021-02-06 NOTE — ED PROVIDER NOTES
"ED Provider Note    CHIEF COMPLAINT  Chief Complaint   Patient presents with   • Abdominal Pain       HPI  Mckay Del Rosario is a 20 y.o. male here for evaluation of abdominal pain.  Patient states that last night he ate crab cakes from [employer], and subsequently a few hours later started vomiting around 11 PM.  Patient states he had some abdominal cramping, and vomiting into the night and this morning, but no diarrhea.  He is the only person that ate the crabcakes, and has no other medical concerns at this time.  Patient has no chest pain or shortness of breath.  No blood in the vomit, and was having \"much worse cramping\" throughout the night but this has somewhat alleviated today.  He is here with his family.  He did not take any medication prior to arrival.  Nothing seems to leave or exacerbate his symptoms.  He describes his initial pain is cramping, but again has improved.    ROS;  Please see HPI  O/W negative     PAST MEDICAL HISTORY   has a past medical history of Asthma, Psychiatric disorder, and Suicidal ideations (8/17/2020).    SOCIAL HISTORY  Social History     Tobacco Use   • Smoking status: Never Smoker   • Smokeless tobacco: Never Used   Substance and Sexual Activity   • Alcohol use: Never     Frequency: Never   • Drug use: Not Currently     Types: Inhaled     Comment: marijuana   • Sexual activity: Not on file       SURGICAL HISTORY   has a past surgical history that includes adenoidectomy and create eardrum opening,gen anesth.    CURRENT MEDICATIONS  Home Medications    **Home medications have not yet been reviewed for this encounter**         ALLERGIES  No Known Allergies    REVIEW OF SYSTEMS  See HPI for further details. Review of systems as above, otherwise all other systems are negative.     PHYSICAL EXAM  VITAL SIGNS: BP (!) 168/79   Pulse 76   Temp 36.2 °C (97.1 °F) (Temporal)   Resp 18   Ht 1.829 m (6')   Wt 82.3 kg (181 lb 7 oz)   SpO2 95%   BMI 24.61 kg/m²   "   Constitutional: Well developed, well nourished. No acute distress.  HEENT: Normocephalic, atraumatic. MMM  Neck: Supple, Full range of motion   Chest/Pulmonary:  No respiratory distress.  Equal expansion   Abdomen; soft, mild epigastric tenderness, no guarding, no peritoneal signs  Musculoskeletal: No deformity, no edema, neurovascular intact.   Neuro: Clear speech, appropriate, cooperative, cranial nerves II-XII grossly intact.  Psych: Normal mood and affect    Results for orders placed or performed during the hospital encounter of 02/06/21   CBC WITH DIFFERENTIAL   Result Value Ref Range    WBC 8.2 4.8 - 10.8 K/uL    RBC 6.47 (H) 4.70 - 6.10 M/uL    Hemoglobin 16.3 14.0 - 18.0 g/dL    Hematocrit 49.6 42.0 - 52.0 %    MCV 76.7 (L) 81.4 - 97.8 fL    MCH 25.2 (L) 27.0 - 33.0 pg    MCHC 32.9 (L) 33.7 - 35.3 g/dL    RDW 38.3 35.9 - 50.0 fL    Platelet Count 214 164 - 446 K/uL    MPV 11.3 9.0 - 12.9 fL    Neutrophils-Polys 67.40 44.00 - 72.00 %    Lymphocytes 24.30 22.00 - 41.00 %    Monocytes 4.70 0.00 - 13.40 %    Eosinophils 2.90 0.00 - 6.90 %    Basophils 0.50 0.00 - 1.80 %    Immature Granulocytes 0.20 0.00 - 0.90 %    Nucleated RBC 0.00 /100 WBC    Neutrophils (Absolute) 5.53 1.82 - 7.42 K/uL    Lymphs (Absolute) 2.00 1.00 - 4.80 K/uL    Monos (Absolute) 0.39 0.00 - 0.85 K/uL    Eos (Absolute) 0.24 0.00 - 0.51 K/uL    Baso (Absolute) 0.04 0.00 - 0.12 K/uL    Immature Granulocytes (abs) 0.02 0.00 - 0.11 K/uL    NRBC (Absolute) 0.00 K/uL   COMP METABOLIC PANEL   Result Value Ref Range    Sodium 139 135 - 145 mmol/L    Potassium 3.9 3.6 - 5.5 mmol/L    Chloride 102 96 - 112 mmol/L    Co2 26 20 - 33 mmol/L    Anion Gap 11.0 7.0 - 16.0    Glucose 111 (H) 65 - 99 mg/dL    Bun 13 8 - 22 mg/dL    Creatinine 0.90 0.50 - 1.40 mg/dL    Calcium 10.0 8.4 - 10.2 mg/dL    AST(SGOT) 29 12 - 45 U/L    ALT(SGPT) 41 2 - 50 U/L    Alkaline Phosphatase 84 30 - 99 U/L    Total Bilirubin 0.4 0.1 - 1.5 mg/dL    Albumin 4.8 3.2 - 4.9  g/dL    Total Protein 7.5 6.0 - 8.2 g/dL    Globulin 2.7 1.9 - 3.5 g/dL    A-G Ratio 1.8 g/dL   LIPASE   Result Value Ref Range    Lipase 33 7 - 58 U/L   ESTIMATED GFR   Result Value Ref Range    GFR If African American >60 >60 mL/min/1.73 m 2    GFR If Non African American >60 >60 mL/min/1.73 m 2     [id number] VIEW   Final Result      Unremarkable AP recumbent abdomen.            PROCEDURES     MEDICAL RECORD  I have reviewed patient's medical record and pertinent results are listed.    COURSE & MEDICAL DECISION MAKING  I have reviewed any medical record information, laboratory studies and radiographic results as noted above.    8:36 AM  The pt is feeling much better.  No further vomiting while here.  He is nontoxic appearing, afebrile, and comfortable.     I you have had any blood pressure issues while here in the emergency department, please see your doctor for a further evaluation or work up.    Differential diagnoses include but not limited to: vomiting, gastritis, sbo.    This patient presents with vomiting .  At this time, I have counseled the patient/family regarding their medications, pain control, and follow up.  They will continue their medications, if any, as prescribed.  They will return immediately for any worsening symptoms and/or any other medical concerns.  They will see their doctor, or contact the doctor provided, in 1-2 days for follow up.       FINAL IMPRESSION  Vomiting       Electronically signed by: Alvin Russo D.O., 2/6/2021 7:27 AM

## 2021-02-06 NOTE — ED NOTES
Pharmacy Medication Reconciliation      Medication reconciliation updated and complete per pt at bedside  Allergies have been verified   No oral ABX within the last 14 days  Patient home pharmacy:[facility]

## 2021-02-06 NOTE — ED TRIAGE NOTES
Chief Complaint   Patient presents with   • Abdominal Pain   Ate crab cakes yesterday and then started to vomit, 2X pm the way to the ED this am.

## 2021-10-01 DIAGNOSIS — F32.5 MAJOR DEPRESSIVE DISORDER WITH SINGLE EPISODE, IN FULL REMISSION (HCC): ICD-10-CM

## 2021-10-01 DIAGNOSIS — F41.1 GENERALIZED ANXIETY DISORDER: ICD-10-CM

## 2021-10-01 DIAGNOSIS — F20.0 PARANOID SCHIZOPHRENIA (HCC): ICD-10-CM

## 2021-10-01 RX ORDER — ARIPIPRAZOLE 5 MG/1
5 TABLET ORAL DAILY
Qty: 90 TABLET | Refills: 1 | Status: SHIPPED | OUTPATIENT
Start: 2021-10-01 | End: 2022-01-21

## 2022-01-21 ENCOUNTER — TELEMEDICINE (OUTPATIENT)
Dept: BEHAVIORAL HEALTH | Facility: CLINIC | Age: 22
End: 2022-01-21
Payer: COMMERCIAL

## 2022-01-21 DIAGNOSIS — F32.5 MAJOR DEPRESSIVE DISORDER WITH SINGLE EPISODE, IN FULL REMISSION (HCC): ICD-10-CM

## 2022-01-21 DIAGNOSIS — F20.0 PARANOID SCHIZOPHRENIA (HCC): ICD-10-CM

## 2022-01-21 DIAGNOSIS — F41.1 GENERALIZED ANXIETY DISORDER: ICD-10-CM

## 2022-01-21 PROCEDURE — 99214 OFFICE O/P EST MOD 30 MIN: CPT | Mod: 95 | Performed by: PSYCHIATRY & NEUROLOGY

## 2022-01-21 NOTE — PROGRESS NOTES
This evaluation was conducted via Zoom using secure and encrypted videoconferencing technology. The patient was in a private location in the state [facility].    The patient was in a private location in the [facility].    The patient's identity was confirmed and verbal consent was obtained for this virtual visit.     PSYCHIATRY FOLLOW-UP NOTE      Name: Mckay Del Rosario  MRN: [id number]  : 2000  Age: 21 y.o.  Date of assessment: 2022  PCP: Karen Celis P.A.-C.  Persons in attendance: Patient and Biological Mother      REASON FOR VISIT/CHIEF COMPLAINT (as stated by Patient):  Mckay Del Rosario is a 21 y.o., White male, attending follow-up appointment for mood and anxiety management.      HISTORY OF PRESENT ILLNESS:  Mckay Del Rosario is a 21 y.o. old male with schizophrenia, HUGO and MDD comes in today for follow up. Patient was last seen 11 months ago, and following treatment planning recommendations were done:  · Continue Abilify 5 mg after dinner for psychosis management and depression augmentation.  Given 3 month supply with 1 refill.  · utilize risperidone 2 mg as PRN for severe agitation or worsening psychotic symptoms. No prescription given: as patient have supply at home.  · Conitnue Zoloft 50 mg daily for depression and anxiety management.  Given 3 month supply with 1 refill.  · Continue sychotherapy for mood and psychosis management.  · Check baseline labs: CBC, CMP, TSH, HbA1c and Lipid Panel    Patient is seen with her mother today and is compliant with Zoloft and Abilify with no side effects.  Mother notes and slow on Abilify and primary care physician did some blood work that showed abnormality but they do not know the exact name.  Mother will send me the message on my chart with the test results.  According to patient and mother there are no concerning behaviors related to mood destabilization, anxiety, anger, paranoia or psychosis.  Agreed with plan of stopping  Abilify and continue Zoloft for the next 3 months and then considering stopping Zoloft as well if clinically indicated.  Patient remained appropriate during the entire evaluation with improved affect.    CURRENT MEDICATIONS:  Current Outpatient Medications   Medication Sig Dispense Refill   • ARIPiprazole (ABILIFY) 5 MG tablet Take 1 Tablet by mouth every day. 90 Tablet 1   • sertraline (ZOLOFT) 50 MG Tab Take 1 Tablet by mouth every day. 90 Tablet 1   • ondansetron (ZOFRAN ODT) 4 MG TABLET DISPERSIBLE Take 1 Tab by mouth every 8 hours as needed for Nausea. 10 Tab 0   • risperiDONE (RISPERDAL) 2 MG Tab TAKE 1 TABLET BY MOUTH EVERY DAY AS NEEDED FOR AGITATION OR WORSENING PSYCHOSIS (Patient not taking: Reported on 2/6/2021) 30 Tab 0   • albuterol (PROVENTIL) 2.5mg/3ml NEBU solution for nebulization 3 mL by Nebulization route every four hours as needed for Shortness of Breath. (Patient not taking: Reported on 2/6/2021) 75 mL 0   • ADVAIR DISKUS INH Inhale 1 Puff every four hours as needed (shortness of breath).     • ALBUTEROL 90 MCG/ACT AERS Inhale 2 Puffs by mouth every 6 hours as needed for Shortness of Breath. (Patient not taking: Reported on 2/6/2021) 1 Inhaler 3     No current facility-administered medications for this visit.       MEDICAL HISTORY  Past Medical History:   Diagnosis Date   • Asthma    • Psychiatric disorder     anxiety and schizophrenia   • Suicidal ideations 8/17/2020     Past Surgical History:   Procedure Laterality Date   • ADENOIDECTOMY     • PB CREATE EARDRUM OPENING,GEN ANESTH      Ear tubes       PAST PSYCHIATRIC HISTORY  Prior psychiatric hospitalization: none  Prior Self harm/suicide attempt: no  Prior Diagnosis: none     PAST PSYCHIATRIC MEDICATIONS  none      FAMILY HISTORY  Psychiatric diagnosis: Maternal uncle with schizophrenia; mother with anxiety; maternal grandmother with depression  History of suicide attempts:  no  Substance abuse history:  no     SUBSTANCE USE  HISTORY:  ALCOHOL: no  TOBACCO: no  CANNABIS: occasional: last use 2 months ago; make anxiety worse  OPIOIDS: no  PRESCRIPTION MEDICATIONS: no  OTHERS: no  History of inpatient/outpatient rehab treatment: no     SOCIAL HISTORY:  Education: Special education studies; IEP; received adjusted diploma in 2018  Employment: employed  Relationship: single  Kids: no  Current living situation: lives with family  Current/past legal issues: no  History of emotional/physical/sexual abuse - no      REVIEW OF SYSTEMS:        Constitutional negative   Eyes negative   Ears/Nose/Mouth/Throat negative   Cardiovascular negative   Respiratory negative   Gastrointestinal negative   Genitourinary negative   Muscular negative   Integumentary negative   Neurological negative   Endocrine negative   Hematologic/Lymphatic negative     PHYSICAL EXAMINAION:  Vital signs: There were no vitals taken for this visit.  Musculoskeletal: Normal gait.   Abnormal movements: none      MENTAL STATUS EXAMINATION      General:   - Grooming and hygiene: Casual,   - Apparent distress: none,   - Behavior: Calm  - Eye Contact:  Good,   - no psychomotor agitation or retardation    - Participation: Active verbal participation  Orientation: Alert and Fully Oriented to person, place and time  Mood: Euthymic  Affect: Flexible and Full range,  Thought Process: Logical and Goal-directed  Thought Content: Denies suicidal or homicidal ideations, intent or plan Within normal limits  Perception: Denies auditory or visual hallucinations. No delusions noted Within normal limits  Attention span and concentration: Intact   Speech:Rate within normal limits and Volume within normal limits  Language: Appropriate   Insight: Good  Judgment: Good  Recent and remote memory: No gross evidence of memory deficits        DEPRESSION SCREENING:  Depression Screen (PHQ-2/PHQ-9) 8/11/2020 2/4/2021   PHQ-2 Total Score 5 0   PHQ-9 Total Score 10 -       Interpretation of PHQ-9 Total Score    Score Severity   1-4 No Depression   5-9 Mild Depression   10-14 Moderate Depression   15-19 Moderately Severe Depression   20-27 Severe Depression    CURRENT RISK:       Suicidal: Low       Homicidal: Low       Self-Harm: Low       Relapse: Low       Crisis Safety Plan Reviewed Not Indicated       If evidence of imminent risk is present, intervention/plan:      MEDICAL RECORDS/LABS/DIAGNOSTIC TESTS REVIEWED:   Ref Range & Units 11 mo ago   Sodium 135 - 145 mmol/L 139    Potassium 3.6 - 5.5 mmol/L 3.9    Chloride 96 - 112 mmol/L 102    Co2 20 - 33 mmol/L 26    Anion Gap 7.0 - 16.0 11.0    Glucose 65 - 99 mg/dL 111 High     Bun 8 - 22 mg/dL 13    Creatinine 0.50 - 1.40 mg/dL 0.90    Calcium 8.4 - 10.2 mg/dL 10.0    AST(SGOT) 12 - 45 U/L 29    ALT(SGPT) 2 - 50 U/L 41    Alkaline Phosphatase 30 - 99 U/L 84    Total Bilirubin 0.1 - 1.5 mg/dL 0.4    Albumin 3.2 - 4.9 g/dL 4.8    Total Protein 6.0 - 8.2 g/dL 7.5    Globulin 1.9 - 3.5 g/dL 2.7    A-G Ratio g/dL 1.8       Ref Range & Units 11 mo ago   WBC 4.8 - 10.8 K/uL 8.2    RBC 4.70 - 6.10 M/uL 6.47 High     Hemoglobin 14.0 - 18.0 g/dL 16.3    Hematocrit 42.0 - 52.0 % 49.6    MCV 81.4 - 97.8 fL 76.7 Low     MCH 27.0 - 33.0 pg 25.2 Low     MCHC 33.7 - 35.3 g/dL 32.9 Low     RDW 35.9 - 50.0 fL 38.3    Platelet Count 164 - 446 K/uL 214    MPV 9.0 - 12.9 fL 11.3    Neutrophils-Polys 44.00 - 72.00 % 67.40    Lymphocytes 22.00 - 41.00 % 24.30    Monocytes 0.00 - 13.40 % 4.70    Eosinophils 0.00 - 6.90 % 2.90    Basophils 0.00 - 1.80 % 0.50    Immature Granulocytes 0.00 - 0.90 % 0.20    Nucleated RBC /100 WBC 0.00    Neutrophils (Absolute) 1.82 - 7.42 K/uL 5.53    Comment: Includes immature neutrophils, if present.   Lymphs (Absolute) 1.00 - 4.80 K/uL 2.00    Monos (Absolute) 0.00 - 0.85 K/uL 0.39    Eos (Absolute) 0.00 - 0.51 K/uL 0.24    Baso (Absolute) 0.00 - 0.12 K/uL 0.04    Immature Granulocytes (abs) 0.00 - 0.11 K/uL 0.02    NRBC (Absolute) K/uL 0.00        [place]   records -   Reviewed     DIAGNOSTIC IMPRESSION(S):  1. paranoid schizophrenia  2. Generalized anxiety disorder  3. Major depressive disorder, moderate  4. Cannabis abuse        PLAN:  (1) Schizophrenia; (2) Generalized anxiety disorder; (3) Major depressive disorder  · Improving  · Stop Abilify  · Conitnue Zoloft 50 mg daily for depression and anxiety management.  Given 3 month supply with 1 refill.  · Continue psychotherapy for mood and psychosis management.  · Continue metabolic monitoring on yearly basis.  · Medication options, alternatives (including no medications) and medication risks/benefits/side effects were discussed in detail.  · The patient was advised to call, message provider on [employer]ite[employer], or come in to the clinic if symptoms worsen or if any future questions/issues regarding their medications arise; the patient verbalized understanding and agreement.    · The patient was educated to call 911, call the suicide hotline, or go to local ER if having thoughts of suicide or homicide; verbalized understanding.      Billing Coding based on:  98505: based on MDM    Return to clinic in 3 months or sooner if symptoms worsen.  Next Appointment: instruction provided on how to make the next appointment.     The proposed treatment plan was discussed with the patient who was provided the opportunity to ask questions and make suggestions regarding alternative treatment. Patient verbalized understanding and expressed agreement with the plan.       Garcia Armendariz M.D.  01/21/22    This note was created using voice recognition software (Dragon). The accuracy of the dictation is limited by the abilities of the software. I have reviewed the note prior to signing, however some errors in grammar and context are still possible. If you have any questions related to this note please do not hesitate to contact our office.

## 2022-05-17 ENCOUNTER — TELEMEDICINE (OUTPATIENT)
Dept: BEHAVIORAL HEALTH | Facility: CLINIC | Age: 22
End: 2022-05-17
Payer: COMMERCIAL

## 2022-05-17 DIAGNOSIS — F20.0 PARANOID SCHIZOPHRENIA (HCC): ICD-10-CM

## 2022-05-17 DIAGNOSIS — F41.1 GENERALIZED ANXIETY DISORDER: ICD-10-CM

## 2022-05-17 DIAGNOSIS — F32.5 MAJOR DEPRESSIVE DISORDER WITH SINGLE EPISODE, IN FULL REMISSION (HCC): ICD-10-CM

## 2022-05-17 PROBLEM — R45.851 SUICIDAL IDEATIONS: Status: RESOLVED | Noted: 2020-08-17 | Resolved: 2022-05-17

## 2022-05-17 PROCEDURE — 90833 PSYTX W PT W E/M 30 MIN: CPT | Mod: 95 | Performed by: PSYCHIATRY & NEUROLOGY

## 2022-05-17 PROCEDURE — 99214 OFFICE O/P EST MOD 30 MIN: CPT | Mod: 95 | Performed by: PSYCHIATRY & NEUROLOGY

## 2022-05-17 RX ORDER — ARIPIPRAZOLE 5 MG/1
5 TABLET ORAL
Qty: 30 TABLET | Refills: 1 | Status: SHIPPED | OUTPATIENT
Start: 2022-05-17 | End: 2022-06-20

## 2022-05-17 NOTE — PROGRESS NOTES
This evaluation was conducted via Zoom using secure and encrypted videoconferencing technology. The patient was in their home in the [facility].    The patient's identity was confirmed and verbal consent was obtained for this virtual visit.      PSYCHIATRY FOLLOW-UP NOTE      Name: Mckay Del Rosario  MRN: [id number]  : 2000  Age: 22 y.o.  Date of assessment: 2022  PCP: Karen Celis P.A.-C.  Persons in attendance: Patient, mother and grandmother      REASON FOR VISIT/CHIEF COMPLAINT (as stated by Patient):  Mckay Del Rosario is a 22 y.o., White male, attending follow-up appointment for mood and anxiety management.      HISTORY OF PRESENT ILLNESS:  Mckay Del Rosario is a 22 y.o. old male with schizophrenia, MDD and HUGO comes in today for follow up. Patient was last seen 2 months ago, and following treatment planning recommendations were done:  · Stop Abilify  · Continue Zoloft 50 mg daily for depression and anxiety management.  Given 3 month supply with 1 refill.  · Continue psychotherapy for mood and psychosis management.  · Continue metabolic monitoring on yearly basis.    Both mother and grandmother were present for the session with patient.  They noticed that patient is getting more irritable again and started drinking alcohol on a daily basis for cannabis use.  He has also started gambling but not to the level he used to before.  They restarted Abilify 1 week ago and have seen small improvement.  Discussed the diagnosis of schizophrenia with patient again and discussed the negative symptoms dominance recently with him not taking self care and not cleaning his room frequently.  Psychoeducation provided on importance of completely stopping alcohol and cannabis for the next 6 weeks and then assessing if increase in Abilify is indicated or not.  Patient is not meeting criteria for fadumo at this time and reports good sleep and energy based on the evaluation for symptoms of  impulsivity driven alcohol drinking is  at this time.  Also educated to consider psychotherapy continuation with daily medication compliance.    PSYCHOTHERAPY ASPECT OF SESSION (16 MIN):  • Most part of the session was dedicated to active listening and implementing supportive psychotherapy skills.  • Motivational interviewing and to remain sober and completely stop alcohol and cannabis use.  • Importance of monitoring for triggers that can destabilize mood and anxiety was emphasized.      CURRENT MEDICATIONS:  Current Outpatient Medications   Medication Sig Dispense Refill   • sertraline (ZOLOFT) 50 MG Tab Take 1 Tablet by mouth every day. 90 Tablet 1   • ondansetron (ZOFRAN ODT) 4 MG TABLET DISPERSIBLE Take 1 Tab by mouth every 8 hours as needed for Nausea. 10 Tab 0   • albuterol (PROVENTIL) 2.5mg/3ml NEBU solution for nebulization 3 mL by Nebulization route every four hours as needed for Shortness of Breath. (Patient not taking: Reported on 2/6/2021) 75 mL 0   • ADVAIR DISKUS INH Inhale 1 Puff every four hours as needed (shortness of breath).     • ALBUTEROL 90 MCG/ACT AERS Inhale 2 Puffs by mouth every 6 hours as needed for Shortness of Breath. (Patient not taking: Reported on 2/6/2021) 1 Inhaler 3     No current facility-administered medications for this visit.       MEDICAL HISTORY  Past Medical History:   Diagnosis Date   • Asthma    • Psychiatric disorder     anxiety and schizophrenia   • Suicidal ideations 8/17/2020     Past Surgical History:   Procedure Laterality Date   • ADENOIDECTOMY     • AK CREATE EARDRUM OPENING,GEN ANESTH      Ear tubes       PAST PSYCHIATRIC HISTORY  Prior psychiatric hospitalization: none  Prior Self harm/suicide attempt: no  Prior Diagnosis: none     PAST PSYCHIATRIC MEDICATIONS  none      FAMILY HISTORY  Psychiatric diagnosis: Maternal uncle with schizophrenia; mother with anxiety; maternal grandmother with depression  History of suicide attempts:  no  Substance abuse  history:  no     SUBSTANCE USE HISTORY:  ALCOHOL: no  TOBACCO: no  CANNABIS: occasional: last use 2 months ago; make anxiety worse  OPIOIDS: no  PRESCRIPTION MEDICATIONS: no  OTHERS: no  History of inpatient/outpatient rehab treatment: no     SOCIAL HISTORY:  Education: Special education studies; IEP; received adjusted diploma in 2018  Employment: employed  Relationship: single  Kids: no  Current living situation: lives with family  Current/past legal issues: no  History of emotional/physical/sexual abuse - no      REVIEW OF SYSTEMS:        Constitutional negative   Eyes negative   Ears/Nose/Mouth/Throat negative   Cardiovascular negative   Respiratory negative   Gastrointestinal negative   Genitourinary negative   Muscular negative   Integumentary negative   Neurological negative   Endocrine negative   Hematologic/Lymphatic negative     PHYSICAL EXAMINAION:  Vital signs: There were no vitals taken for this visit.  Musculoskeletal: Normal gait.   Abnormal movements: none      MENTAL STATUS EXAMINATION      General:   - Grooming and hygiene: Casual,   - Apparent distress: tense,   - Behavior: Tense  - Eye Contact:  Good,   - no psychomotor agitation or retardation    - Participation: Active verbal participation  Orientation: Alert and Fully Oriented to person, place and time  Mood: Anxious  Affect: Full range,  Thought Process: Logical and Goal-directed  Thought Content: Denies suicidal or homicidal ideations, intent or plan Within normal limits  Perception: Denies auditory or visual hallucinations. No delusions noted Within normal limits  Attention span and concentration: Intact   Speech:Rate within normal limits and Volume within normal limits  Language: Appropriate   Insight: Good  Judgment: Good  Recent and remote memory: No gross evidence of memory deficits        DEPRESSION SCREENING:  Depression Screen (PHQ-2/PHQ-9) 8/11/2020 2/4/2021   PHQ-2 Total Score 5 0   PHQ-9 Total Score 10 -       Interpretation of  PHQ-9 Total Score   Score Severity   1-4 No Depression   5-9 Mild Depression   10-14 Moderate Depression   15-19 Moderately Severe Depression   20-27 Severe Depression    CURRENT RISK:       Suicidal: Low       Homicidal: Low       Self-Harm: Low       Relapse: Low       Crisis Safety Plan Reviewed Not Indicated       If evidence of imminent risk is present, intervention/plan:      MEDICAL RECORDS/LABS/DIAGNOSTIC TESTS REVIEWED:  No new lab since last visit     [place]  records -   Reviewed     PLAN:  (1) Schizophrenia; (2) Generalized anxiety disorder; (3) Major depressive disorder  · Recent increase in irritability with alcohol & cannabis abuse  · Restart Abilify at 5 mg daily for mood and impulsivity management & psychosis prevention.  · Continue Zoloft 50 mg daily for depression and anxiety management.    · Motivated to completely stop alcohol and cannabis.  · Continue psychotherapy for mood and psychosis management.  · Continue metabolic monitoring on yearly basis.  · Medication options, alternatives (including no medications) and medication risks/benefits/side effects were discussed in detail.  · The patient was advised to call, message provider on [employer], or come in to the clinic if symptoms worsen or if any future questions/issues regarding their medications arise; the patient verbalized understanding and agreement.    · The patient was educated to call 911, call the suicide hotline, or go to local ER if having thoughts of suicide or homicide; verbalized understanding.    Billing Coding based on:  19222: based on [facility]  52956: based on psychotherapy timing  9  Return to clinic in 6 weeks or sooner if symptoms worsen.  Next Appointment: instruction provided on how to make the next appointment.     The proposed treatment plan was discussed with the patient who was provided the opportunity to ask questions and make suggestions regarding alternative treatment. Patient verbalized understanding and expressed  agreement with the plan.       Garcia Armendariz M.D.  05/17/22    This note was created using voice recognition software (Dragon). The accuracy of the dictation is limited by the abilities of the software. I have reviewed the note prior to signing, however some errors in grammar and context are still possible. If you have any questions related to this note please do not hesitate to contact our office.

## 2022-08-14 ENCOUNTER — HOSPITAL ENCOUNTER (EMERGENCY)
Facility: MEDICAL CENTER | Age: 22
End: 2022-08-14
Attending: EMERGENCY MEDICINE
Payer: COMMERCIAL

## 2022-08-14 VITALS
WEIGHT: 189.6 LBS | HEART RATE: 53 BPM | RESPIRATION RATE: 16 BRPM | SYSTOLIC BLOOD PRESSURE: 105 MMHG | DIASTOLIC BLOOD PRESSURE: 55 MMHG | TEMPERATURE: 97.4 F | BODY MASS INDEX: 26.54 KG/M2 | HEIGHT: 71 IN | OXYGEN SATURATION: 96 %
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
## 2022-08-14 DIAGNOSIS — T07.XXXA MULTIPLE CONTUSIONS: ICD-10-CM

## 2022-08-14 PROCEDURE — 99284 EMERGENCY DEPT VISIT MOD MDM: CPT

## 2022-08-14 ASSESSMENT — FIBROSIS 4 INDEX: FIB4 SCORE: 0.47
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
## 2022-08-19 ENCOUNTER — OFFICE VISIT (OUTPATIENT)
Dept: MEDICAL GROUP | Facility: MEDICAL CENTER | Age: 22
End: 2022-08-19
Payer: COMMERCIAL

## 2022-08-19 VITALS
SYSTOLIC BLOOD PRESSURE: 118 MMHG | BODY MASS INDEX: 26.48 KG/M2 | WEIGHT: 189.15 LBS | HEART RATE: 78 BPM | HEIGHT: 71 IN | OXYGEN SATURATION: 96 % | DIASTOLIC BLOOD PRESSURE: 64 MMHG | TEMPERATURE: 98.1 F

## 2022-08-19 DIAGNOSIS — Z13.220 ENCOUNTER FOR LIPID SCREENING FOR CARDIOVASCULAR DISEASE: ICD-10-CM

## 2022-08-19 DIAGNOSIS — F41.1 GENERALIZED ANXIETY DISORDER: ICD-10-CM

## 2022-08-19 DIAGNOSIS — F20.0 PARANOID SCHIZOPHRENIA (HCC): ICD-10-CM

## 2022-08-19 DIAGNOSIS — Z13.6 ENCOUNTER FOR LIPID SCREENING FOR CARDIOVASCULAR DISEASE: ICD-10-CM

## 2022-08-19 DIAGNOSIS — Z13.1 SCREENING FOR DIABETES MELLITUS: ICD-10-CM

## 2022-08-19 DIAGNOSIS — Z13.21 ENCOUNTER FOR VITAMIN DEFICIENCY SCREENING: ICD-10-CM

## 2022-08-19 DIAGNOSIS — J45.20 MILD INTERMITTENT ASTHMA WITHOUT COMPLICATION: ICD-10-CM
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
## 2022-08-19 DIAGNOSIS — Z13.0 SCREENING FOR DEFICIENCY ANEMIA: ICD-10-CM

## 2022-08-19 DIAGNOSIS — F32.5 MAJOR DEPRESSIVE DISORDER WITH SINGLE EPISODE, IN FULL REMISSION (HCC): ICD-10-CM

## 2022-08-19 DIAGNOSIS — K59.01 SLOW TRANSIT CONSTIPATION: ICD-10-CM
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
## 2022-08-19 PROBLEM — J45.909 ASTHMA: Status: ACTIVE | Noted: 2022-08-19

## 2022-08-19 PROCEDURE — 99214 OFFICE O/P EST MOD 30 MIN: CPT | Performed by: FAMILY MEDICINE

## 2022-08-19 RX ORDER — ALBUTEROL SULFATE 90 UG/1
2 AEROSOL, METERED RESPIRATORY (INHALATION) EVERY 4 HOURS PRN
Qty: 1 EACH | Refills: 5 | Status: SHIPPED | OUTPATIENT
Start: 2022-08-19 | End: 2023-05-08 | Stop reason: SDUPTHER

## 2022-08-19 ASSESSMENT — ENCOUNTER SYMPTOMS
CHILLS: 0
PALPITATIONS: 0
WEAKNESS: 0
DEPRESSION: 0
VOMITING: 0
SORE THROAT: 0
DIZZINESS: 0
SHORTNESS OF BREATH: 0
TINGLING: 0
CONSTIPATION: 0
NERVOUS/ANXIOUS: 0
BLURRED VISION: 0
DIARRHEA: 0
BRUISES/BLEEDS EASILY: 0
MYALGIAS: 0
DOUBLE VISION: 0
NAUSEA: 0
FEVER: 0
ABDOMINAL PAIN: 1
COUGH: 0
WEIGHT LOSS: 0

## 2022-08-19 ASSESSMENT — FIBROSIS 4 INDEX: FIB4 SCORE: 0.47
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
## 2022-08-19 NOTE — ASSESSMENT & PLAN NOTE
Chronic, stable.  Continue following with his psychiatrist as well as continuation of his sertraline 50 mg every evening.

## 2022-08-19 NOTE — ASSESSMENT & PLAN NOTE
Chronic, stable.  Continue sertraline 50 mg every evening.  Continue to follow with Dr. Armendariz with psychiatry.

## 2022-08-19 NOTE — ASSESSMENT & PLAN NOTE
Chronic, unstable.  Increase in fiber, trial Metamucil.  Try MiraLAX once daily.  Lots of fruits and vegetables dark leafy greens.  Keep himself nice and hydrated.

## 2022-08-30 ENCOUNTER — HOSPITAL ENCOUNTER (OUTPATIENT)
Dept: LAB | Facility: MEDICAL CENTER | Age: 22
End: 2022-08-30
Attending: FAMILY MEDICINE
Payer: COMMERCIAL

## 2022-08-30 DIAGNOSIS — Z13.6 ENCOUNTER FOR LIPID SCREENING FOR CARDIOVASCULAR DISEASE: ICD-10-CM

## 2022-08-30 DIAGNOSIS — Z13.21 ENCOUNTER FOR VITAMIN DEFICIENCY SCREENING: ICD-10-CM

## 2022-08-30 DIAGNOSIS — Z13.1 SCREENING FOR DIABETES MELLITUS: ICD-10-CM

## 2022-08-30 DIAGNOSIS — Z13.220 ENCOUNTER FOR LIPID SCREENING FOR CARDIOVASCULAR DISEASE: ICD-10-CM

## 2022-08-30 DIAGNOSIS — Z13.0 SCREENING FOR DEFICIENCY ANEMIA: ICD-10-CM

## 2022-08-30 LAB
25(OH)D3 SERPL-MCNC: 16 NG/ML (ref 30–100)
ALBUMIN SERPL BCP-MCNC: 4.7 G/DL (ref 3.2–4.9)
ALBUMIN/GLOB SERPL: 1.9 G/DL
ALP SERPL-CCNC: 95 U/L (ref 30–99)
ALT SERPL-CCNC: 41 U/L (ref 2–50)
ANION GAP SERPL CALC-SCNC: 12 MMOL/L (ref 7–16)
AST SERPL-CCNC: 25 U/L (ref 12–45)
BASOPHILS # BLD AUTO: 0.6 % (ref 0–1.8)
BASOPHILS # BLD: 0.04 K/UL (ref 0–0.12)
BILIRUB SERPL-MCNC: 0.6 MG/DL (ref 0.1–1.5)
BUN SERPL-MCNC: 10 MG/DL (ref 8–22)
CALCIUM SERPL-MCNC: 9.3 MG/DL (ref 8.4–10.2)
CHLORIDE SERPL-SCNC: 105 MMOL/L (ref 96–112)
CHOLEST SERPL-MCNC: 291 MG/DL (ref 100–199)
CO2 SERPL-SCNC: 24 MMOL/L (ref 20–33)
CREAT SERPL-MCNC: 0.96 MG/DL (ref 0.5–1.4)
EOSINOPHIL # BLD AUTO: 0.31 K/UL (ref 0–0.51)
EOSINOPHIL NFR BLD: 4.3 % (ref 0–6.9)
ERYTHROCYTE [DISTWIDTH] IN BLOOD BY AUTOMATED COUNT: 41.5 FL (ref 35.9–50)
FASTING STATUS PATIENT QL REPORTED: NORMAL
GFR SERPLBLD CREATININE-BSD FMLA CKD-EPI: 114 ML/MIN/1.73 M 2
GLOBULIN SER CALC-MCNC: 2.5 G/DL (ref 1.9–3.5)
GLUCOSE SERPL-MCNC: 93 MG/DL (ref 65–99)
HCT VFR BLD AUTO: 48.5 % (ref 42–52)
HDLC SERPL-MCNC: 54 MG/DL
HGB BLD-MCNC: 16.1 G/DL (ref 14–18)
IMM GRANULOCYTES # BLD AUTO: 0.02 K/UL (ref 0–0.11)
IMM GRANULOCYTES NFR BLD AUTO: 0.3 % (ref 0–0.9)
LDLC SERPL CALC-MCNC: 216 MG/DL
LYMPHOCYTES # BLD AUTO: 2.24 K/UL (ref 1–4.8)
LYMPHOCYTES NFR BLD: 31.2 % (ref 22–41)
MCH RBC QN AUTO: 25.5 PG (ref 27–33)
MCHC RBC AUTO-ENTMCNC: 33.2 G/DL (ref 33.7–35.3)
MCV RBC AUTO: 76.9 FL (ref 81.4–97.8)
MONOCYTES # BLD AUTO: 0.46 K/UL (ref 0–0.85)
MONOCYTES NFR BLD AUTO: 6.4 % (ref 0–13.4)
NEUTROPHILS # BLD AUTO: 4.1 K/UL (ref 1.82–7.42)
NEUTROPHILS NFR BLD: 57.2 % (ref 44–72)
NRBC # BLD AUTO: 0 K/UL
NRBC BLD-RTO: 0 /100 WBC
PLATELET # BLD AUTO: 212 K/UL (ref 164–446)
PMV BLD AUTO: 11.8 FL (ref 9–12.9)
POTASSIUM SERPL-SCNC: 3.9 MMOL/L (ref 3.6–5.5)
PROT SERPL-MCNC: 7.2 G/DL (ref 6–8.2)
RBC # BLD AUTO: 6.31 M/UL (ref 4.7–6.1)
SODIUM SERPL-SCNC: 141 MMOL/L (ref 135–145)
TRIGL SERPL-MCNC: 105 MG/DL (ref 0–149)
WBC # BLD AUTO: 7.2 K/UL (ref 4.8–10.8)

## 2022-08-30 PROCEDURE — 85025 COMPLETE CBC W/AUTO DIFF WBC: CPT

## 2022-08-30 PROCEDURE — 80053 COMPREHEN METABOLIC PANEL: CPT

## 2022-08-30 PROCEDURE — 80061 LIPID PANEL: CPT

## 2022-08-30 PROCEDURE — 82306 VITAMIN D 25 HYDROXY: CPT

## 2022-08-30 PROCEDURE — 36415 COLL VENOUS BLD VENIPUNCTURE: CPT

## 2022-09-16 ENCOUNTER — OFFICE VISIT (OUTPATIENT)
Dept: MEDICAL GROUP | Facility: MEDICAL CENTER | Age: 22
End: 2022-09-16
Payer: COMMERCIAL

## 2022-09-16 VITALS
SYSTOLIC BLOOD PRESSURE: 100 MMHG | WEIGHT: 194 LBS | HEIGHT: 69 IN | RESPIRATION RATE: 14 BRPM | TEMPERATURE: 96.4 F | DIASTOLIC BLOOD PRESSURE: 60 MMHG | OXYGEN SATURATION: 95 % | BODY MASS INDEX: 28.73 KG/M2 | HEART RATE: 59 BPM

## 2022-09-16 DIAGNOSIS — D75.1 POLYCYTHEMIA: ICD-10-CM

## 2022-09-16 DIAGNOSIS — E78.00 PURE HYPERCHOLESTEROLEMIA: ICD-10-CM

## 2022-09-16 DIAGNOSIS — Z23 IMMUNIZATION DUE: ICD-10-CM

## 2022-09-16 PROCEDURE — 99214 OFFICE O/P EST MOD 30 MIN: CPT | Performed by: FAMILY MEDICINE

## 2022-09-16 RX ORDER — ROSUVASTATIN CALCIUM 10 MG/1
10 TABLET, COATED ORAL EVERY EVENING
Qty: 90 TABLET | Refills: 1 | Status: SHIPPED | OUTPATIENT
Start: 2022-09-16 | End: 2023-03-13

## 2022-09-16 ASSESSMENT — FIBROSIS 4 INDEX: FIB4 SCORE: 0.41

## 2022-09-16 NOTE — ASSESSMENT & PLAN NOTE
New diagnosis for the patient.  Discussed with the patient about the importance of good control of his cholesterol level.  With positive family history of cardiovascular disease and early MI, recommended genetic study to test for family hypercholesteremia.  Recommended statin therapy, will initiate rosuvastatin 10 mg every evening.  Repeat lipid profile, and CMP in 6 months.  Discussed potential side effects of this medication, patient provided with information to take home regarding rosuvastatin.

## 2022-09-16 NOTE — ASSESSMENT & PLAN NOTE
Chronic, stable.  Recommended to make sure he is getting plenty of fruits and vegetables and lean meats in his diet.  Will repeat CBC and iron studies in 3 months.

## 2022-09-16 NOTE — PROGRESS NOTES
Mckay Del Rosario is a pleasant 22 y.o. male here for   Chief Complaint   Patient presents with    Follow-Up    Lab Results      HPI:   Problem   Pure Hypercholesterolemia    Recent completed labs which showed elevation to his LDL.  Grandmother does report positive family history for cardiovascular disease, grandfather had a heart attack in his 40s.  Grandma had a heart attack in her early 50s.     Latest Reference Range & Units 8/30/22 16:22   Cholesterol,Tot 100 - 199 mg/dL 291 (H)   Triglycerides 0 - 149 mg/dL 105   HDL >=40 mg/dL 54   LDL <100 mg/dL 216 (H)   (H): Data is abnormally high     Polycythemia    Completed labs which showed polycythemia low MCV and MCH.  Present in his labs for the last few years.  Denies any when discussing these results with him.  Denies snoring, difficulty sleeping.     Latest Reference Range & Units 5/11/21 09:46 8/30/22 16:22   RBC 4.70 - 6.10 M/uL 6.15 (H) (E) 6.31 (H)   Hemoglobin 14.0 - 18.0 g/dL 15.4 (E) 16.1   Hematocrit 42.0 - 52.0 % 47.7 (E) 48.5   MCV 81.4 - 97.8 fL 77.5 (L) (E) 76.9 (L)   MCH 27.0 - 33.0 pg 25.1 (L) (E) 25.5 (L)   MCHC 33.7 - 35.3 g/dL 32.4 (L) (E) 33.2 (L)   RDW 35.9 - 50.0 fL 15.5 (H) (E) 41.5   Platelet Count 164 - 446 K/uL 221 (E) 212   MPV 9.0 - 12.9 fL 9.5 (E) 11.8   (H): Data is abnormally high  (L): Data is abnormally low  (E): External lab result            Current Medicines (including changes today)  Current Outpatient Medications   Medication Sig Dispense Refill    rosuvastatin (CRESTOR) 10 MG Tab Take 1 Tablet by mouth every evening. 90 Tablet 1    albuterol 108 (90 Base) MCG/ACT Aero Soln inhalation aerosol Inhale 2 Puffs every four hours as needed for Shortness of Breath. 1 Each 5    ARIPiprazole (ABILIFY) 5 MG tablet Take 1 Tablet by mouth 1/2 hour after dinner. 90 Tablet 0    sertraline (ZOLOFT) 50 MG Tab Take 1 Tablet by mouth 1/2 hour after dinner. 90 Tablet 0    albuterol (PROVENTIL) 2.5mg/3ml NEBU solution for nebulization 3 mL  "by Nebulization route every four hours as needed for Shortness of Breath. 75 mL 0    ADVAIR DISKUS INH Inhale 1 Puff every four hours as needed (shortness of breath).       No current facility-administered medications for this visit.     Past Medical/ Surgical History  He  has a past medical history of Asthma, Psychiatric disorder, and Suicidal ideations (8/17/2020).  He  has a past surgical history that includes adenoidectomy and pr create eardrum opening,gen anesth.       Objective:     /60 (BP Location: Left arm, Patient Position: Sitting, BP Cuff Size: Adult)   Pulse (!) 59   Temp (!) 35.8 °C (96.4 °F) (Temporal)   Resp 14   Ht 1.76 m (5' 9.29\")   Wt 88 kg (194 lb 0.1 oz)   SpO2 95%  Body mass index is 28.41 kg/m².    Physical Exam  Constitutional:       General: He is not in acute distress.  HENT:      Head: Normocephalic and atraumatic.   Eyes:      Conjunctiva/sclera: Conjunctivae normal.      Pupils: Pupils are equal, round, and reactive to light.   Pulmonary:      Effort: Pulmonary effort is normal. No respiratory distress.   Abdominal:      General: There is no distension.   Musculoskeletal:      Cervical back: Normal range of motion and neck supple.   Skin:     General: Skin is warm and dry.      Findings: No rash.   Neurological:      Mental Status: He is alert and oriented to person, place, and time.      Gait: Gait is intact.   Psychiatric:         Mood and Affect: Affect normal.        Imaging:  No recent imaging to review    Labs  Recent labs from 08/30/2022 reviewed with the patient.    Assessment and Plan:   The following treatment plan was discussed  Problem List Items Addressed This Visit       Pure hypercholesterolemia     New diagnosis for the patient.  Discussed with the patient about the importance of good control of his cholesterol level.  With positive family history of cardiovascular disease and early MI, recommended genetic study to test for family " hypercholesteremia.  Recommended statin therapy, will initiate rosuvastatin 10 mg every evening.  Repeat lipid profile, and CMP in 6 months.  Discussed potential side effects of this medication, patient provided with information to take home regarding rosuvastatin.         Relevant Medications    rosuvastatin (CRESTOR) 10 MG Tab    Other Relevant Orders    Referral to Genetic Research Studies    Polycythemia     Chronic, stable.  Recommended to make sure he is getting plenty of fruits and vegetables and lean meats in his diet.  Will repeat CBC and iron studies in 3 months.         Relevant Orders    CBC WITH DIFFERENTIAL    IRON/TOTAL IRON BIND    FERRITIN     Other Visit Diagnoses       Immunization due        Relevant Orders    INFLUENZA VACCINE QUAD INJ (PF)             Followup: Return in about 3 months (around 12/16/2022), or if symptoms worsen or fail to improve, for Follow-up for labs.      Please note that this dictation was created using voice recognition software. I have made every reasonable attempt to correct obvious errors, but I expect that there are errors of grammar and possibly content that I did not discover before finalizing the note.

## 2022-12-09 DIAGNOSIS — F20.0 PARANOID SCHIZOPHRENIA (HCC): ICD-10-CM

## 2022-12-09 DIAGNOSIS — F32.5 MAJOR DEPRESSIVE DISORDER WITH SINGLE EPISODE, IN FULL REMISSION (HCC): ICD-10-CM

## 2022-12-09 RX ORDER — ARIPIPRAZOLE 5 MG/1
5 TABLET ORAL
Qty: 90 TABLET | Refills: 0 | Status: SHIPPED | OUTPATIENT
Start: 2022-12-09 | End: 2023-06-09 | Stop reason: SDUPTHER

## 2022-12-23 ENCOUNTER — APPOINTMENT (OUTPATIENT)
Dept: MEDICAL GROUP | Facility: MEDICAL CENTER | Age: 22
End: 2022-12-23
Payer: COMMERCIAL

## 2023-03-11 DIAGNOSIS — E78.00 PURE HYPERCHOLESTEROLEMIA: ICD-10-CM

## 2023-03-13 RX ORDER — ROSUVASTATIN CALCIUM 10 MG/1
10 TABLET, COATED ORAL EVERY EVENING
Qty: 90 TABLET | Refills: 0 | Status: SHIPPED | OUTPATIENT
Start: 2023-03-13

## 2023-05-08 DIAGNOSIS — J45.20 MILD INTERMITTENT ASTHMA WITHOUT COMPLICATION: ICD-10-CM

## 2023-05-08 RX ORDER — ALBUTEROL SULFATE 90 UG/1
2 AEROSOL, METERED RESPIRATORY (INHALATION) EVERY 4 HOURS PRN
Qty: 1 EACH | Refills: 5 | Status: SHIPPED | OUTPATIENT
Start: 2023-05-08

## 2023-06-09 ENCOUNTER — OFFICE VISIT (OUTPATIENT)
Dept: BEHAVIORAL HEALTH | Facility: CLINIC | Age: 23
End: 2023-06-09

## 2023-06-09 DIAGNOSIS — F32.5 MAJOR DEPRESSIVE DISORDER WITH SINGLE EPISODE, IN FULL REMISSION (HCC): ICD-10-CM

## 2023-06-09 DIAGNOSIS — F20.0 PARANOID SCHIZOPHRENIA (HCC): ICD-10-CM

## 2023-06-09 DIAGNOSIS — F41.1 GENERALIZED ANXIETY DISORDER: ICD-10-CM

## 2023-06-09 PROCEDURE — 90833 PSYTX W PT W E/M 30 MIN: CPT | Performed by: PSYCHIATRY & NEUROLOGY

## 2023-06-09 PROCEDURE — 99214 OFFICE O/P EST MOD 30 MIN: CPT | Performed by: PSYCHIATRY & NEUROLOGY

## 2023-06-09 RX ORDER — ARIPIPRAZOLE 5 MG/1
5 TABLET ORAL
Qty: 90 TABLET | Refills: 0 | Status: SHIPPED | OUTPATIENT
Start: 2023-06-09 | End: 2023-09-05

## 2023-06-09 NOTE — PROGRESS NOTES
PSYCHIATRY FOLLOW-UP NOTE      Name: Mckay Del Rosario  MRN: [id number]  : 2000  Age: 23 y.o.  Date of assessment: 2023  PCP: JOSE Whyte  Persons in attendance: Patient      REASON FOR VISIT/CHIEF COMPLAINT (as stated by Patient):  Mckay Del Rosario is a 23 y.o., White male, attending follow-up appointment for schizophrenia, mood and anxiety management.      HISTORY OF PRESENT ILLNESS:  Mckay Del Rosario is a 23 y.o. old male with schizophrenia, HUGO and MDD comes in today for follow up. Patient was last seen 1 year ago, and following treatment planning recommendations were done:  Restart Abilify at 5 mg daily for mood and impulsivity management & psychosis prevention.  Continue Zoloft 50 mg daily for depression and anxiety management.    Motivated to completely stop alcohol and cannabis.  Continue psychotherapy for mood and psychosis management.  Continue metabolic monitoring on yearly basis.    Patient is compliant with both medication with stable mood and anxiety.  Patient remained appropriate during entire evaluation with improved affect and no signs of psychosis or poor self-care noted.    I also interviewed the patient with his grandmother who also describes patient is doing well.    Reviewed his past one behavior where he got paranoid and got angry at grandfather but no other incident pointing to her paranoia reported.  He is doing well at his job and no issues interacting with people.    After reviewing the risk and benefit they both agreed with plan of reducing Abilify and Zoloft for next 2 months and then they will contact me on MyChart.  If patient is doing well agreed with plan of stopping both medications.  Educated patient to monitor for alcohol use moving forward.    PSYCHOTHERAPY ASPECT OF SESSION (16 MIN):  Initial session dedicated to letting patient and grandmother expressed their feelings related to recent changes.  Validation provided for  appropriate emotional responses and normalization was done.  Importance of monitoring for triggers that can destabilize mood and anxiety was emphasized.  Later part of the session was dedicated to active listening and implementing supportive psychotherapy skills.      CURRENT MEDICATIONS:  Current Outpatient Medications   Medication Sig Dispense Refill    albuterol 108 (90 Base) MCG/ACT Aero Soln inhalation aerosol Inhale 2 Puffs every four hours as needed for Shortness of Breath. 1 Each 5    rosuvastatin (CRESTOR) 10 MG Tab Take 1 Tablet by mouth every evening. 90 Tablet 0    ARIPiprazole (ABILIFY) 5 MG tablet TAKE 1 TABLET BY MOUTH 1/2 HOUR AFTER DINNER. 90 Tablet 0    sertraline (ZOLOFT) 50 MG Tab Take 1 Tablet by mouth 1/2 hour after dinner. 90 Tablet 0    albuterol (PROVENTIL) 2.5mg/3ml NEBU solution for nebulization 3 mL by Nebulization route every four hours as needed for Shortness of Breath. 75 mL 0    ADVAIR DISKUS INH Inhale 1 Puff every four hours as needed (shortness of breath).       No current facility-administered medications for this visit.       MEDICAL HISTORY  Past Medical History:   Diagnosis Date    Asthma     Psychiatric disorder     anxiety and schizophrenia    Suicidal ideations 8/17/2020     Past Surgical History:   Procedure Laterality Date    ADENOIDECTOMY      DE CREATE EARDRUM OPENING,GEN ANESTH      Ear tubes       PAST PSYCHIATRIC HISTORY  Prior psychiatric hospitalization: none  Prior Self harm/suicide attempt: no  Prior Diagnosis: none     PAST PSYCHIATRIC MEDICATIONS  none      FAMILY HISTORY  Psychiatric diagnosis: Maternal uncle with schizophrenia; mother with anxiety; maternal grandmother with depression  History of suicide attempts:  no  Substance abuse history:  no     SUBSTANCE USE HISTORY:  ALCOHOL: no  TOBACCO: no  CANNABIS: occasional: last use 2 months ago; make anxiety worse  OPIOIDS: no  PRESCRIPTION MEDICATIONS: no  OTHERS: no  History of inpatient/outpatient rehab  treatment: no     SOCIAL HISTORY:  Education: Special education studies; IEP; received adjusted diploma in 2018  Employment: employed  Relationship: single  Kids: no  Current living situation: lives with family  Current/past legal issues: no  History of emotional/physical/sexual abuse - no      REVIEW OF SYSTEMS:        Constitutional negative   Eyes negative   Ears/Nose/Mouth/Throat negative   Cardiovascular negative   Respiratory negative   Gastrointestinal negative   Genitourinary negative   Muscular negative   Integumentary negative   Neurological negative   Endocrine negative   Hematologic/Lymphatic negative     PHYSICAL EXAMINAION:  Vital signs: There were no vitals taken for this visit.  Musculoskeletal: Normal gait.   Abnormal movements: none      MENTAL STATUS EXAMINATION      General:   - Grooming and hygiene: Casual,   - Apparent distress: none,   - Behavior: Calm  - Eye Contact:  Good,   - no psychomotor agitation or retardation    - Participation: Active verbal participation  Orientation: Alert and Fully Oriented to person, place and time  Mood: Euthymic  Affect: Flexible and Full range,  Thought Process: Logical and Goal-directed  Thought Content: Denies suicidal or homicidal ideations, intent or plan   Perception: Denies auditory or visual hallucinations. No delusions noted   Attention span and concentration: Intact   Speech:Rate within normal limits and Volume within normal limits  Language: Appropriate   Insight: Good  Judgment: Good  Recent and remote memory: No gross evidence of memory deficits        DEPRESSION SCREENIN/11/2020     1:30 PM 2021    10:30 AM   Depression Screen (PHQ-2/PHQ-9)   PHQ-2 Total Score 5 0   PHQ-9 Total Score 10        Interpretation of PHQ-9 Total Score   Score Severity   1-4 No Depression   5-9 Mild Depression   10-14 Moderate Depression   15-19 Moderately Severe Depression   20-27 Severe Depression    CURRENT RISK:       Suicidal: Low       Homicidal: Low        Self-Harm: Low       Relapse: Low       Crisis Safety Plan Reviewed Not Indicated       If evidence of imminent risk is present, intervention/plan:      MEDICAL RECORDS/LABS/DIAGNOSTIC TESTS REVIEWED:  No new lab since last visit     [place]  records -   Reviewed     PLAN:  (1) ? Schizophrenia; (2) Generalized anxiety disorder; (3) Major depressive disorder  Stable  Taper Abilify to 2.5 mg daily for mood and impulsivity management & psychosis prevention.  Taper Zoloft to 25 mg daily for depression and anxiety management.    Motivated to completely stop alcohol and cannabis.  Continue psychotherapy for mood and psychosis management.  Continue metabolic monitoring on yearly basis.  Medication options, alternatives (including no medications) and medication risks/benefits/side effects were discussed in detail.  The patient was advised to call, message provider on MyChart, or come in to the clinic if symptoms worsen or if any future questions/issues regarding their medications arise; the patient verbalized understanding and agreement.    The patient was educated to call 911, call the suicide hotline, or go to local ER if having thoughts of suicide or homicide; verbalized understanding.    Billing Coding based on:  [id number] based on [facility]  10614: based on psychotherapy timing    PATIENT IS SELF-PAY: WILL MESSAGE ME IN 2 MONTHS: IF DOING WELL: STOP BOTH MEDICATIONS.    Return to clinic in 2 months or sooner if symptoms worsen.  Next Appointment: instruction provided on how to make the next appointment.     The proposed treatment plan was discussed with the patient who was provided the opportunity to ask questions and make suggestions regarding alternative treatment. Patient verbalized understanding and expressed agreement with the plan.       Garcia Armendariz M.D.  06/09/23    This note was created using voice recognition software (Dragon). The accuracy of the dictation is limited by the abilities of the software. I have  reviewed the note prior to signing, however some errors in grammar and context are still possible. If you have any questions related to this note please do not hesitate to contact our office.

## 2023-09-02 DIAGNOSIS — F32.5 MAJOR DEPRESSIVE DISORDER WITH SINGLE EPISODE, IN FULL REMISSION (HCC): ICD-10-CM

## 2023-09-02 DIAGNOSIS — F20.0 PARANOID SCHIZOPHRENIA (HCC): ICD-10-CM

## 2023-09-04 DIAGNOSIS — F32.5 MAJOR DEPRESSIVE DISORDER WITH SINGLE EPISODE, IN FULL REMISSION (HCC): ICD-10-CM

## 2023-09-04 DIAGNOSIS — F41.1 GENERALIZED ANXIETY DISORDER: ICD-10-CM

## 2023-09-05 RX ORDER — ARIPIPRAZOLE 5 MG/1
5 TABLET ORAL
Qty: 90 TABLET | Refills: 0 | Status: SHIPPED | OUTPATIENT
Start: 2023-09-05